# Patient Record
Sex: MALE | Race: WHITE | Employment: OTHER | URBAN - METROPOLITAN AREA
[De-identification: names, ages, dates, MRNs, and addresses within clinical notes are randomized per-mention and may not be internally consistent; named-entity substitution may affect disease eponyms.]

---

## 2017-03-27 ENCOUNTER — OFFICE VISIT (OUTPATIENT)
Dept: FAMILY MEDICINE CLINIC | Age: 67
End: 2017-03-27

## 2017-03-27 VITALS
HEIGHT: 72 IN | HEART RATE: 85 BPM | SYSTOLIC BLOOD PRESSURE: 120 MMHG | OXYGEN SATURATION: 95 % | WEIGHT: 195.8 LBS | DIASTOLIC BLOOD PRESSURE: 80 MMHG | BODY MASS INDEX: 26.52 KG/M2

## 2017-03-27 DIAGNOSIS — C44.300 SKIN CANCER OF FACE: ICD-10-CM

## 2017-03-27 DIAGNOSIS — Z23 NEED FOR PNEUMOCOCCAL VACCINATION: ICD-10-CM

## 2017-03-27 DIAGNOSIS — Z01.818 PRE-OP EXAM: Primary | ICD-10-CM

## 2017-03-27 PROCEDURE — G8420 CALC BMI NORM PARAMETERS: HCPCS | Performed by: FAMILY MEDICINE

## 2017-03-27 PROCEDURE — 3017F COLORECTAL CA SCREEN DOC REV: CPT | Performed by: FAMILY MEDICINE

## 2017-03-27 PROCEDURE — G8427 DOCREV CUR MEDS BY ELIG CLIN: HCPCS | Performed by: FAMILY MEDICINE

## 2017-03-27 PROCEDURE — 99214 OFFICE O/P EST MOD 30 MIN: CPT | Performed by: FAMILY MEDICINE

## 2017-03-27 PROCEDURE — 1036F TOBACCO NON-USER: CPT | Performed by: FAMILY MEDICINE

## 2017-03-27 PROCEDURE — 4040F PNEUMOC VAC/ADMIN/RCVD: CPT | Performed by: FAMILY MEDICINE

## 2017-03-27 PROCEDURE — 1123F ACP DISCUSS/DSCN MKR DOCD: CPT | Performed by: FAMILY MEDICINE

## 2017-03-27 PROCEDURE — G0009 ADMIN PNEUMOCOCCAL VACCINE: HCPCS | Performed by: FAMILY MEDICINE

## 2017-03-27 PROCEDURE — 90670 PCV13 VACCINE IM: CPT | Performed by: FAMILY MEDICINE

## 2017-03-27 PROCEDURE — G8484 FLU IMMUNIZE NO ADMIN: HCPCS | Performed by: FAMILY MEDICINE

## 2017-05-30 RX ORDER — LEVOTHYROXINE SODIUM 0.12 MG/1
TABLET ORAL
Qty: 30 TABLET | Refills: 5 | Status: SHIPPED | OUTPATIENT
Start: 2017-05-30 | End: 2017-06-05 | Stop reason: SDUPTHER

## 2017-06-05 ENCOUNTER — TELEPHONE (OUTPATIENT)
Dept: FAMILY MEDICINE CLINIC | Age: 67
End: 2017-06-05

## 2017-06-05 RX ORDER — LEVOTHYROXINE SODIUM 0.12 MG/1
TABLET ORAL
Qty: 30 TABLET | Refills: 3 | Status: SHIPPED | OUTPATIENT
Start: 2017-06-05 | End: 2018-03-03 | Stop reason: SDUPTHER

## 2017-08-02 ENCOUNTER — TELEPHONE (OUTPATIENT)
Dept: FAMILY MEDICINE CLINIC | Age: 67
End: 2017-08-02

## 2017-08-08 ENCOUNTER — TELEPHONE (OUTPATIENT)
Dept: ENT CLINIC | Age: 67
End: 2017-08-08

## 2017-08-08 ENCOUNTER — OFFICE VISIT (OUTPATIENT)
Dept: ENT CLINIC | Age: 67
End: 2017-08-08

## 2017-08-08 VITALS
HEART RATE: 78 BPM | DIASTOLIC BLOOD PRESSURE: 68 MMHG | TEMPERATURE: 97.6 F | SYSTOLIC BLOOD PRESSURE: 111 MMHG | WEIGHT: 195 LBS | BODY MASS INDEX: 26.41 KG/M2 | HEIGHT: 72 IN

## 2017-08-08 DIAGNOSIS — M26.621 ARTHRALGIA OF RIGHT TEMPOROMANDIBULAR JOINT: Primary | ICD-10-CM

## 2017-08-08 DIAGNOSIS — K08.89 PAIN, DENTAL: ICD-10-CM

## 2017-08-08 PROCEDURE — 99203 OFFICE O/P NEW LOW 30 MIN: CPT | Performed by: OTOLARYNGOLOGY

## 2017-08-08 PROCEDURE — 4040F PNEUMOC VAC/ADMIN/RCVD: CPT | Performed by: OTOLARYNGOLOGY

## 2017-08-08 PROCEDURE — 1036F TOBACCO NON-USER: CPT | Performed by: OTOLARYNGOLOGY

## 2017-08-08 PROCEDURE — G8427 DOCREV CUR MEDS BY ELIG CLIN: HCPCS | Performed by: OTOLARYNGOLOGY

## 2017-08-08 PROCEDURE — G8419 CALC BMI OUT NRM PARAM NOF/U: HCPCS | Performed by: OTOLARYNGOLOGY

## 2017-08-08 PROCEDURE — 3017F COLORECTAL CA SCREEN DOC REV: CPT | Performed by: OTOLARYNGOLOGY

## 2017-08-08 RX ORDER — NAPROXEN 500 MG/1
500 TABLET ORAL 2 TIMES DAILY WITH MEALS
Qty: 60 TABLET | Refills: 2 | Status: SHIPPED | OUTPATIENT
Start: 2017-08-08 | End: 2021-04-23 | Stop reason: ALTCHOICE

## 2017-08-11 ENCOUNTER — HOSPITAL ENCOUNTER (OUTPATIENT)
Dept: CT IMAGING | Age: 67
Discharge: HOME | End: 2017-08-11
Attending: OTOLARYNGOLOGY | Admitting: OTOLARYNGOLOGY

## 2017-08-11 DIAGNOSIS — R51 HEADACHE(784.0): ICD-10-CM

## 2017-08-11 DIAGNOSIS — K08.89 PAIN, DENTAL: ICD-10-CM

## 2017-08-11 DIAGNOSIS — M26.621 ARTHRALGIA OF RIGHT TEMPOROMANDIBULAR JOINT: ICD-10-CM

## 2017-08-14 ENCOUNTER — TELEPHONE (OUTPATIENT)
Dept: ENT CLINIC | Age: 67
End: 2017-08-14

## 2017-08-28 ENCOUNTER — OFFICE VISIT (OUTPATIENT)
Dept: FAMILY MEDICINE CLINIC | Age: 67
End: 2017-08-28

## 2017-08-28 ENCOUNTER — TELEPHONE (OUTPATIENT)
Dept: FAMILY MEDICINE CLINIC | Age: 67
End: 2017-08-28

## 2017-08-28 VITALS
HEART RATE: 72 BPM | TEMPERATURE: 98.4 F | BODY MASS INDEX: 25.76 KG/M2 | SYSTOLIC BLOOD PRESSURE: 112 MMHG | WEIGHT: 190.2 LBS | DIASTOLIC BLOOD PRESSURE: 70 MMHG | HEIGHT: 72 IN | OXYGEN SATURATION: 98 %

## 2017-08-28 DIAGNOSIS — J01.00 ACUTE MAXILLARY SINUSITIS, RECURRENCE NOT SPECIFIED: ICD-10-CM

## 2017-08-28 PROCEDURE — G8427 DOCREV CUR MEDS BY ELIG CLIN: HCPCS | Performed by: NURSE PRACTITIONER

## 2017-08-28 PROCEDURE — 1123F ACP DISCUSS/DSCN MKR DOCD: CPT | Performed by: NURSE PRACTITIONER

## 2017-08-28 PROCEDURE — G8419 CALC BMI OUT NRM PARAM NOF/U: HCPCS | Performed by: NURSE PRACTITIONER

## 2017-08-28 PROCEDURE — 1036F TOBACCO NON-USER: CPT | Performed by: NURSE PRACTITIONER

## 2017-08-28 PROCEDURE — 3017F COLORECTAL CA SCREEN DOC REV: CPT | Performed by: NURSE PRACTITIONER

## 2017-08-28 PROCEDURE — 99213 OFFICE O/P EST LOW 20 MIN: CPT | Performed by: NURSE PRACTITIONER

## 2017-08-28 PROCEDURE — 4040F PNEUMOC VAC/ADMIN/RCVD: CPT | Performed by: NURSE PRACTITIONER

## 2017-08-28 RX ORDER — AMOXICILLIN AND CLAVULANATE POTASSIUM 875; 125 MG/1; MG/1
1 TABLET, FILM COATED ORAL 2 TIMES DAILY WITH MEALS
Qty: 20 TABLET | Refills: 0 | Status: SHIPPED | OUTPATIENT
Start: 2017-08-28 | End: 2017-09-07

## 2017-08-28 ASSESSMENT — ENCOUNTER SYMPTOMS
FACIAL SWELLING: 0
COUGH: 0
ABDOMINAL PAIN: 0
CONSTIPATION: 0
EYES NEGATIVE: 1
SHORTNESS OF BREATH: 0
DIARRHEA: 0
TROUBLE SWALLOWING: 0
SWOLLEN GLANDS: 0
RHINORRHEA: 1
VOICE CHANGE: 0
APNEA: 0
SINUS PRESSURE: 1
VOMITING: 0
NAUSEA: 0
BLOOD IN STOOL: 0
BACK PAIN: 0
SORE THROAT: 0
ANAL BLEEDING: 0
ABDOMINAL DISTENTION: 0
COLOR CHANGE: 0
CHOKING: 0
STRIDOR: 0
HOARSE VOICE: 1
WHEEZING: 0
CHEST TIGHTNESS: 0

## 2018-01-12 ENCOUNTER — NURSE ONLY (OUTPATIENT)
Dept: FAMILY MEDICINE CLINIC | Age: 68
End: 2018-01-12

## 2018-01-12 DIAGNOSIS — Z23 NEED FOR INFLUENZA VACCINATION: Primary | ICD-10-CM

## 2018-01-12 PROCEDURE — G0008 ADMIN INFLUENZA VIRUS VAC: HCPCS | Performed by: FAMILY MEDICINE

## 2018-01-12 PROCEDURE — 90662 IIV NO PRSV INCREASED AG IM: CPT | Performed by: FAMILY MEDICINE

## 2018-03-03 ENCOUNTER — TELEPHONE (OUTPATIENT)
Dept: FAMILY MEDICINE CLINIC | Age: 68
End: 2018-03-03

## 2018-03-03 RX ORDER — LEVOTHYROXINE SODIUM 0.12 MG/1
TABLET ORAL
Qty: 30 TABLET | Refills: 3 | Status: SHIPPED | OUTPATIENT
Start: 2018-03-03 | End: 2018-06-01 | Stop reason: SDUPTHER

## 2018-06-01 RX ORDER — LEVOTHYROXINE SODIUM 0.12 MG/1
TABLET ORAL
Qty: 30 TABLET | Refills: 0 | Status: SHIPPED | OUTPATIENT
Start: 2018-06-01 | End: 2018-07-03 | Stop reason: SDUPTHER

## 2018-07-03 RX ORDER — LEVOTHYROXINE SODIUM 0.12 MG/1
TABLET ORAL
Qty: 30 TABLET | Refills: 1 | Status: SHIPPED | OUTPATIENT
Start: 2018-07-03 | End: 2018-09-14 | Stop reason: SDUPTHER

## 2018-08-01 ENCOUNTER — OFFICE VISIT (OUTPATIENT)
Dept: FAMILY MEDICINE CLINIC | Age: 68
End: 2018-08-01

## 2018-08-01 VITALS
HEART RATE: 63 BPM | SYSTOLIC BLOOD PRESSURE: 120 MMHG | WEIGHT: 188.1 LBS | OXYGEN SATURATION: 95 % | HEIGHT: 72 IN | BODY MASS INDEX: 25.48 KG/M2 | DIASTOLIC BLOOD PRESSURE: 82 MMHG

## 2018-08-01 DIAGNOSIS — Z00.00 WELL ADULT EXAM: ICD-10-CM

## 2018-08-01 DIAGNOSIS — J30.89 NON-SEASONAL ALLERGIC RHINITIS, UNSPECIFIED CHRONICITY, UNSPECIFIED TRIGGER: ICD-10-CM

## 2018-08-01 DIAGNOSIS — N52.8 OTHER MALE ERECTILE DYSFUNCTION: ICD-10-CM

## 2018-08-01 DIAGNOSIS — I83.893 VARICOSE VEINS OF BOTH LEGS WITH EDEMA: ICD-10-CM

## 2018-08-01 DIAGNOSIS — E03.9 HYPOTHYROIDISM, UNSPECIFIED TYPE: ICD-10-CM

## 2018-08-01 PROBLEM — J30.9 ALLERGIC RHINITIS: Status: ACTIVE | Noted: 2018-08-01

## 2018-08-01 PROBLEM — Z01.818 PRE-OP EXAM: Status: RESOLVED | Noted: 2017-03-27 | Resolved: 2018-08-01

## 2018-08-01 LAB
A/G RATIO: 1.8 (ref 1.1–2.2)
ALBUMIN SERPL-MCNC: 4.4 G/DL (ref 3.4–5)
ALP BLD-CCNC: 56 U/L (ref 40–129)
ALT SERPL-CCNC: 17 U/L (ref 10–40)
ANION GAP SERPL CALCULATED.3IONS-SCNC: 12 MMOL/L (ref 3–16)
AST SERPL-CCNC: 17 U/L (ref 15–37)
BASOPHILS ABSOLUTE: 0 K/UL (ref 0–0.2)
BASOPHILS RELATIVE PERCENT: 0.8 %
BILIRUB SERPL-MCNC: 0.3 MG/DL (ref 0–1)
BUN BLDV-MCNC: 14 MG/DL (ref 7–20)
CALCIUM SERPL-MCNC: 9.2 MG/DL (ref 8.3–10.6)
CHLORIDE BLD-SCNC: 101 MMOL/L (ref 99–110)
CHOLESTEROL, FASTING: 218 MG/DL (ref 0–199)
CO2: 28 MMOL/L (ref 21–32)
CREAT SERPL-MCNC: 1 MG/DL (ref 0.8–1.3)
EOSINOPHILS ABSOLUTE: 0.3 K/UL (ref 0–0.6)
EOSINOPHILS RELATIVE PERCENT: 4.9 %
GFR AFRICAN AMERICAN: >60
GFR NON-AFRICAN AMERICAN: >60
GLOBULIN: 2.4 G/DL
GLUCOSE FASTING: 89 MG/DL (ref 70–99)
HCT VFR BLD CALC: 45.3 % (ref 40.5–52.5)
HDLC SERPL-MCNC: 46 MG/DL (ref 40–60)
HEMOGLOBIN: 15.2 G/DL (ref 13.5–17.5)
LDL CHOLESTEROL CALCULATED: 122 MG/DL
LYMPHOCYTES ABSOLUTE: 1.6 K/UL (ref 1–5.1)
LYMPHOCYTES RELATIVE PERCENT: 30.9 %
MCH RBC QN AUTO: 32 PG (ref 26–34)
MCHC RBC AUTO-ENTMCNC: 33.4 G/DL (ref 31–36)
MCV RBC AUTO: 95.6 FL (ref 80–100)
MONOCYTES ABSOLUTE: 0.4 K/UL (ref 0–1.3)
MONOCYTES RELATIVE PERCENT: 8 %
NEUTROPHILS ABSOLUTE: 2.9 K/UL (ref 1.7–7.7)
NEUTROPHILS RELATIVE PERCENT: 55.4 %
PDW BLD-RTO: 13.5 % (ref 12.4–15.4)
PLATELET # BLD: 228 K/UL (ref 135–450)
PMV BLD AUTO: 8.8 FL (ref 5–10.5)
POTASSIUM SERPL-SCNC: 4.7 MMOL/L (ref 3.5–5.1)
RBC # BLD: 4.74 M/UL (ref 4.2–5.9)
SODIUM BLD-SCNC: 141 MMOL/L (ref 136–145)
TOTAL PROTEIN: 6.8 G/DL (ref 6.4–8.2)
TRIGLYCERIDE, FASTING: 248 MG/DL (ref 0–150)
TSH SERPL DL<=0.05 MIU/L-ACNC: 2.43 UIU/ML (ref 0.27–4.2)
VLDLC SERPL CALC-MCNC: 50 MG/DL
WBC # BLD: 5.2 K/UL (ref 4–11)

## 2018-08-01 PROCEDURE — G0439 PPPS, SUBSEQ VISIT: HCPCS | Performed by: FAMILY MEDICINE

## 2018-08-01 PROCEDURE — 4040F PNEUMOC VAC/ADMIN/RCVD: CPT | Performed by: FAMILY MEDICINE

## 2018-08-01 RX ORDER — SILDENAFIL 100 MG/1
100 TABLET, FILM COATED ORAL PRN
Qty: 10 TABLET | Refills: 3 | Status: SHIPPED | OUTPATIENT
Start: 2018-08-01 | End: 2021-04-23 | Stop reason: ALTCHOICE

## 2018-08-01 ASSESSMENT — PATIENT HEALTH QUESTIONNAIRE - PHQ9
SUM OF ALL RESPONSES TO PHQ QUESTIONS 1-9: 0
SUM OF ALL RESPONSES TO PHQ9 QUESTIONS 1 & 2: 0
2. FEELING DOWN, DEPRESSED OR HOPELESS: 0
1. LITTLE INTEREST OR PLEASURE IN DOING THINGS: 0

## 2018-09-14 RX ORDER — LEVOTHYROXINE SODIUM 0.12 MG/1
TABLET ORAL
Qty: 30 TABLET | Refills: 1 | Status: SHIPPED | OUTPATIENT
Start: 2018-09-14 | End: 2018-11-12 | Stop reason: SDUPTHER

## 2019-02-08 ENCOUNTER — TELEPHONE (OUTPATIENT)
Dept: FAMILY MEDICINE CLINIC | Age: 69
End: 2019-02-08

## 2019-02-08 DIAGNOSIS — C44.300 SKIN CANCER OF FACE: Primary | ICD-10-CM

## 2019-06-24 ENCOUNTER — TELEPHONE (OUTPATIENT)
Dept: FAMILY MEDICINE CLINIC | Age: 69
End: 2019-06-24

## 2019-06-24 NOTE — TELEPHONE ENCOUNTER
Patient wife calling for Jamie Orozco. He is looking to schedule a pe and thyroid tested blood work. Symptoms not feeling well , tired not sleeping at night. cb to get him in soon.  Call Joselo Cox [phone]

## 2019-07-08 ENCOUNTER — OFFICE VISIT (OUTPATIENT)
Dept: FAMILY MEDICINE CLINIC | Age: 69
End: 2019-07-08
Payer: COMMERCIAL

## 2019-07-08 VITALS
WEIGHT: 192.4 LBS | OXYGEN SATURATION: 98 % | SYSTOLIC BLOOD PRESSURE: 120 MMHG | HEART RATE: 75 BPM | HEIGHT: 72 IN | DIASTOLIC BLOOD PRESSURE: 80 MMHG | BODY MASS INDEX: 26.06 KG/M2

## 2019-07-08 DIAGNOSIS — Z00.00 ROUTINE GENERAL MEDICAL EXAMINATION AT A HEALTH CARE FACILITY: ICD-10-CM

## 2019-07-08 DIAGNOSIS — Z11.59 NEED FOR HEPATITIS C SCREENING TEST: ICD-10-CM

## 2019-07-08 DIAGNOSIS — Z00.00 WELL ADULT EXAM: Primary | ICD-10-CM

## 2019-07-08 DIAGNOSIS — E03.9 HYPOTHYROIDISM, UNSPECIFIED TYPE: ICD-10-CM

## 2019-07-08 DIAGNOSIS — Z00.00 WELL ADULT EXAM: ICD-10-CM

## 2019-07-08 DIAGNOSIS — J30.89 NON-SEASONAL ALLERGIC RHINITIS, UNSPECIFIED TRIGGER: ICD-10-CM

## 2019-07-08 LAB
A/G RATIO: 2.3 (ref 1.1–2.2)
ALBUMIN SERPL-MCNC: 4.3 G/DL (ref 3.4–5)
ALP BLD-CCNC: 53 U/L (ref 40–129)
ALT SERPL-CCNC: 20 U/L (ref 10–40)
ANION GAP SERPL CALCULATED.3IONS-SCNC: 12 MMOL/L (ref 3–16)
AST SERPL-CCNC: 20 U/L (ref 15–37)
BASOPHILS ABSOLUTE: 0 K/UL (ref 0–0.2)
BASOPHILS RELATIVE PERCENT: 0.7 %
BILIRUB SERPL-MCNC: 0.4 MG/DL (ref 0–1)
BUN BLDV-MCNC: 17 MG/DL (ref 7–20)
CALCIUM SERPL-MCNC: 9 MG/DL (ref 8.3–10.6)
CHLORIDE BLD-SCNC: 107 MMOL/L (ref 99–110)
CHOLESTEROL, TOTAL: 195 MG/DL (ref 0–199)
CO2: 26 MMOL/L (ref 21–32)
CREAT SERPL-MCNC: 1 MG/DL (ref 0.8–1.3)
EOSINOPHILS ABSOLUTE: 0.2 K/UL (ref 0–0.6)
EOSINOPHILS RELATIVE PERCENT: 3.5 %
GFR AFRICAN AMERICAN: >60
GFR NON-AFRICAN AMERICAN: >60
GLOBULIN: 1.9 G/DL
GLUCOSE BLD-MCNC: 88 MG/DL (ref 70–99)
HCT VFR BLD CALC: 43 % (ref 40.5–52.5)
HDLC SERPL-MCNC: 47 MG/DL (ref 40–60)
HEMOGLOBIN: 14.6 G/DL (ref 13.5–17.5)
HEPATITIS C ANTIBODY INTERPRETATION: NORMAL
LDL CHOLESTEROL CALCULATED: 113 MG/DL
LYMPHOCYTES ABSOLUTE: 1.5 K/UL (ref 1–5.1)
LYMPHOCYTES RELATIVE PERCENT: 29.1 %
MCH RBC QN AUTO: 32.1 PG (ref 26–34)
MCHC RBC AUTO-ENTMCNC: 33.9 G/DL (ref 31–36)
MCV RBC AUTO: 94.8 FL (ref 80–100)
MONOCYTES ABSOLUTE: 0.4 K/UL (ref 0–1.3)
MONOCYTES RELATIVE PERCENT: 8.5 %
NEUTROPHILS ABSOLUTE: 3.1 K/UL (ref 1.7–7.7)
NEUTROPHILS RELATIVE PERCENT: 58.2 %
PDW BLD-RTO: 13.5 % (ref 12.4–15.4)
PLATELET # BLD: 205 K/UL (ref 135–450)
PMV BLD AUTO: 8.4 FL (ref 5–10.5)
POTASSIUM SERPL-SCNC: 4.6 MMOL/L (ref 3.5–5.1)
RBC # BLD: 4.54 M/UL (ref 4.2–5.9)
SODIUM BLD-SCNC: 145 MMOL/L (ref 136–145)
T4 FREE: 1.2 NG/DL (ref 0.9–1.8)
TOTAL PROTEIN: 6.2 G/DL (ref 6.4–8.2)
TRIGL SERPL-MCNC: 174 MG/DL (ref 0–150)
TSH SERPL DL<=0.05 MIU/L-ACNC: 1.96 UIU/ML (ref 0.27–4.2)
VLDLC SERPL CALC-MCNC: 35 MG/DL
WBC # BLD: 5.2 K/UL (ref 4–11)

## 2019-07-08 PROCEDURE — 4040F PNEUMOC VAC/ADMIN/RCVD: CPT | Performed by: FAMILY MEDICINE

## 2019-07-08 PROCEDURE — G0439 PPPS, SUBSEQ VISIT: HCPCS | Performed by: FAMILY MEDICINE

## 2019-07-08 PROCEDURE — 1123F ACP DISCUSS/DSCN MKR DOCD: CPT | Performed by: FAMILY MEDICINE

## 2019-07-08 PROCEDURE — 3017F COLORECTAL CA SCREEN DOC REV: CPT | Performed by: FAMILY MEDICINE

## 2019-07-08 ASSESSMENT — PATIENT HEALTH QUESTIONNAIRE - PHQ9
SUM OF ALL RESPONSES TO PHQ QUESTIONS 1-9: 0
SUM OF ALL RESPONSES TO PHQ QUESTIONS 1-9: 0

## 2019-07-08 ASSESSMENT — LIFESTYLE VARIABLES
HOW OFTEN DO YOU HAVE SIX OR MORE DRINKS ON ONE OCCASION: 0
HOW OFTEN DURING THE LAST YEAR HAVE YOU HAD A FEELING OF GUILT OR REMORSE AFTER DRINKING: 0
HOW OFTEN DURING THE LAST YEAR HAVE YOU FOUND THAT YOU WERE NOT ABLE TO STOP DRINKING ONCE YOU HAD STARTED: 0
HAS A RELATIVE, FRIEND, DOCTOR, OR ANOTHER HEALTH PROFESSIONAL EXPRESSED CONCERN ABOUT YOUR DRINKING OR SUGGESTED YOU CUT DOWN: 0
HAVE YOU OR SOMEONE ELSE BEEN INJURED AS A RESULT OF YOUR DRINKING: 0
AUDIT TOTAL SCORE: 1
HOW MANY STANDARD DRINKS CONTAINING ALCOHOL DO YOU HAVE ON A TYPICAL DAY: 0
HOW OFTEN DURING THE LAST YEAR HAVE YOU FAILED TO DO WHAT WAS NORMALLY EXPECTED FROM YOU BECAUSE OF DRINKING: 0
HOW OFTEN DURING THE LAST YEAR HAVE YOU BEEN UNABLE TO REMEMBER WHAT HAPPENED THE NIGHT BEFORE BECAUSE YOU HAD BEEN DRINKING: 0
HOW OFTEN DURING THE LAST YEAR HAVE YOU NEEDED AN ALCOHOLIC DRINK FIRST THING IN THE MORNING TO GET YOURSELF GOING AFTER A NIGHT OF HEAVY DRINKING: 0
HOW OFTEN DO YOU HAVE A DRINK CONTAINING ALCOHOL: 1
AUDIT-C TOTAL SCORE: 1

## 2019-07-08 ASSESSMENT — ANXIETY QUESTIONNAIRES: GAD7 TOTAL SCORE: 0

## 2019-11-01 ENCOUNTER — TELEPHONE (OUTPATIENT)
Dept: FAMILY MEDICINE CLINIC | Age: 69
End: 2019-11-01

## 2019-11-08 DIAGNOSIS — C44.300 SKIN CANCER OF FACE: Primary | ICD-10-CM

## 2019-12-16 RX ORDER — LEVOTHYROXINE SODIUM 0.12 MG/1
TABLET ORAL
Qty: 90 TABLET | Refills: 3 | Status: SHIPPED | OUTPATIENT
Start: 2019-12-16 | End: 2021-01-15 | Stop reason: SDUPTHER

## 2020-01-22 ENCOUNTER — OFFICE VISIT (OUTPATIENT)
Dept: DERMATOLOGY | Age: 70
End: 2020-01-22
Payer: COMMERCIAL

## 2020-01-22 PROBLEM — L57.0 ACTINIC KERATOSES: Status: ACTIVE | Noted: 2020-01-22

## 2020-01-22 PROBLEM — L82.0 SEBORRHEIC KERATOSES, INFLAMED: Status: ACTIVE | Noted: 2020-01-22

## 2020-01-22 PROBLEM — Z85.828 HISTORY OF BASAL CELL CARCINOMA: Status: ACTIVE | Noted: 2020-01-22

## 2020-01-22 PROBLEM — Z85.828 HISTORY OF SQUAMOUS CELL CARCINOMA OF SKIN: Status: ACTIVE | Noted: 2020-01-22

## 2020-01-22 PROCEDURE — 4040F PNEUMOC VAC/ADMIN/RCVD: CPT | Performed by: DERMATOLOGY

## 2020-01-22 PROCEDURE — G8427 DOCREV CUR MEDS BY ELIG CLIN: HCPCS | Performed by: DERMATOLOGY

## 2020-01-22 PROCEDURE — 3017F COLORECTAL CA SCREEN DOC REV: CPT | Performed by: DERMATOLOGY

## 2020-01-22 PROCEDURE — 1123F ACP DISCUSS/DSCN MKR DOCD: CPT | Performed by: DERMATOLOGY

## 2020-01-22 PROCEDURE — 99203 OFFICE O/P NEW LOW 30 MIN: CPT | Performed by: DERMATOLOGY

## 2020-01-22 PROCEDURE — G8484 FLU IMMUNIZE NO ADMIN: HCPCS | Performed by: DERMATOLOGY

## 2020-01-22 PROCEDURE — 1036F TOBACCO NON-USER: CPT | Performed by: DERMATOLOGY

## 2020-01-22 PROCEDURE — G8417 CALC BMI ABV UP PARAM F/U: HCPCS | Performed by: DERMATOLOGY

## 2020-01-22 PROCEDURE — 17110 DESTRUCTION B9 LES UP TO 14: CPT | Performed by: DERMATOLOGY

## 2020-01-22 NOTE — PROGRESS NOTES
[address] [facility], [facility]) Physicians    Previous clinic visit: none      CC: lesion check      HPI:    1.)  Here today with several scaly red spots on face and scalp of uncertain duration. Has h/o AKs in past s/p LN2 tx.     2.)  Has h/o many NMSCs over past several years, s/p excisions and MMS by outside derm group. No new lesions of concern today. 3.) Has itchy spot on L mid back of many mo duration; no tx to date    DERM HISTORY:   Personal history of NMSC or MM- yes - NMSCs    Family history of NMSC or MM- no   Sunburns easily- Yes   Uses sunscreen- Yes  History of tanning bed use- No    ADDITIONAL HISTORY:    I have reviewed past medical and surgical histories, current medications, allergies, social and family histories as documented in the patient's electronic medical record. Current Outpatient Medications   Medication Sig Dispense Refill    levothyroxine (SYNTHROID) 125 MCG tablet TAKE 1 TABLET BY MOUTH EVERY DAY 90 tablet 3    sildenafil (VIAGRA) 100 MG tablet Take 1 tablet by mouth as needed for Erectile Dysfunction 10 tablet 3    naproxen (NAPROSYN) 500 MG tablet Take 1 tablet by mouth 2 times daily (with meals) 60 tablet 2     No current facility-administered medications for this visit.         ROS:    General: No fevers, chills, sweats, unexplained weight loss or weight gain, fatigue, malaise   Skin: Denies additional lesions    Heme: No history of bleeding diatheses    Allergy: Denies seasonal or environmental allergies or other medication allergies     PHYSICAL EXAM:    General: Well-appearing, NAD    Neurologic/psychiatric: Patient is AOx3; mood and affect are appropriate and congruent  Eyes: conjunctivae and eyelids without erythema, injection, or discharge   Integument: Examination was performed of the following and were unremarkable except as otherwise noted below:scalp, hair, face, ears, mucosa of gums/teeth/cutaneous and mucosal lips, buccal mucosa, oropharynx, neck,

## 2020-01-23 ENCOUNTER — TELEPHONE (OUTPATIENT)
Dept: DERMATOLOGY | Age: 70
End: 2020-01-23

## 2020-01-23 NOTE — TELEPHONE ENCOUNTER
Patient of Ethan Buitrago referred to Dr. Gonzalez Wood for blue light pdt  Tx. Manuel has several questions regarding the procedure. He can be reached at [phone].   Thanks

## 2020-01-27 NOTE — TELEPHONE ENCOUNTER
ASSESSMENT AND PLAN:    1.)  AKs, diffuse:  - Plan for PDT to forehead, temples, malar cheeks, nasal dorsum, crown and scalp vertex with 1 hr 20 min incubation time.    - MD f/u 8 weeks after tx  Scanned into chart under media

## 2020-02-07 ENCOUNTER — OFFICE VISIT (OUTPATIENT)
Dept: FAMILY MEDICINE CLINIC | Age: 70
End: 2020-02-07
Payer: COMMERCIAL

## 2020-02-07 VITALS
HEART RATE: 77 BPM | OXYGEN SATURATION: 98 % | SYSTOLIC BLOOD PRESSURE: 120 MMHG | BODY MASS INDEX: 26.38 KG/M2 | WEIGHT: 194.8 LBS | DIASTOLIC BLOOD PRESSURE: 80 MMHG | HEIGHT: 72 IN

## 2020-02-07 DIAGNOSIS — E03.9 HYPOTHYROIDISM, UNSPECIFIED TYPE: ICD-10-CM

## 2020-02-07 PROBLEM — R22.31 MASS OF RIGHT HAND: Status: ACTIVE | Noted: 2020-02-07

## 2020-02-07 LAB
T4 FREE: 1.3 NG/DL (ref 0.9–1.8)
TSH SERPL DL<=0.05 MIU/L-ACNC: 3.2 UIU/ML (ref 0.27–4.2)

## 2020-02-07 PROCEDURE — 99213 OFFICE O/P EST LOW 20 MIN: CPT | Performed by: FAMILY MEDICINE

## 2020-02-07 PROCEDURE — G8484 FLU IMMUNIZE NO ADMIN: HCPCS | Performed by: FAMILY MEDICINE

## 2020-02-07 PROCEDURE — G8417 CALC BMI ABV UP PARAM F/U: HCPCS | Performed by: FAMILY MEDICINE

## 2020-02-07 PROCEDURE — G8427 DOCREV CUR MEDS BY ELIG CLIN: HCPCS | Performed by: FAMILY MEDICINE

## 2020-02-07 PROCEDURE — 1036F TOBACCO NON-USER: CPT | Performed by: FAMILY MEDICINE

## 2020-02-07 PROCEDURE — 4040F PNEUMOC VAC/ADMIN/RCVD: CPT | Performed by: FAMILY MEDICINE

## 2020-02-07 PROCEDURE — 1123F ACP DISCUSS/DSCN MKR DOCD: CPT | Performed by: FAMILY MEDICINE

## 2020-02-07 PROCEDURE — 3017F COLORECTAL CA SCREEN DOC REV: CPT | Performed by: FAMILY MEDICINE

## 2020-02-07 SDOH — ECONOMIC STABILITY: INCOME INSECURITY: HOW HARD IS IT FOR YOU TO PAY FOR THE VERY BASICS LIKE FOOD, HOUSING, MEDICAL CARE, AND HEATING?: NOT HARD AT ALL

## 2020-02-07 SDOH — ECONOMIC STABILITY: TRANSPORTATION INSECURITY
IN THE PAST 12 MONTHS, HAS LACK OF TRANSPORTATION KEPT YOU FROM MEETINGS, WORK, OR FROM GETTING THINGS NEEDED FOR DAILY LIVING?: NO

## 2020-02-07 SDOH — ECONOMIC STABILITY: TRANSPORTATION INSECURITY
IN THE PAST 12 MONTHS, HAS THE LACK OF TRANSPORTATION KEPT YOU FROM MEDICAL APPOINTMENTS OR FROM GETTING MEDICATIONS?: NO

## 2020-02-07 SDOH — ECONOMIC STABILITY: FOOD INSECURITY: WITHIN THE PAST 12 MONTHS, YOU WORRIED THAT YOUR FOOD WOULD RUN OUT BEFORE YOU GOT MONEY TO BUY MORE.: NEVER TRUE

## 2020-02-07 SDOH — ECONOMIC STABILITY: FOOD INSECURITY: WITHIN THE PAST 12 MONTHS, THE FOOD YOU BOUGHT JUST DIDN'T LAST AND YOU DIDN'T HAVE MONEY TO GET MORE.: NEVER TRUE

## 2020-02-07 ASSESSMENT — PATIENT HEALTH QUESTIONNAIRE - PHQ9
1. LITTLE INTEREST OR PLEASURE IN DOING THINGS: 0
2. FEELING DOWN, DEPRESSED OR HOPELESS: 0
SUM OF ALL RESPONSES TO PHQ9 QUESTIONS 1 & 2: 0
SUM OF ALL RESPONSES TO PHQ QUESTIONS 1-9: 0
SUM OF ALL RESPONSES TO PHQ QUESTIONS 1-9: 0

## 2020-02-07 NOTE — PROGRESS NOTES
Subjective:     Patient ID:Michael Kurtz is a 78 y.o. male. Hand Pain    The incident occurred more than 1 week ago. The incident occurred at home. There was no injury mechanism. The pain is present in the right hand. The quality of the pain is described as burning. The patient is experiencing no pain. The pain has been improving since the incident. Pertinent negatives include no muscle weakness or tingling. Nothing aggravates the symptoms. He has tried nothing for the symptoms. The treatment provided moderate relief. Hypothyroidism: Patient presents for evaluation of thyroid function. Symptoms consist of insomnia. Symptoms have present for 7 months. The symptoms are mild. The problem has been unchanged. Previous thyroid studies include TSH and total T4. The hypothyroidism is due to hypothyroidism. No Known Allergies    Current Outpatient Medications   Medication Sig Dispense Refill    levothyroxine (SYNTHROID) 125 MCG tablet TAKE 1 TABLET BY MOUTH EVERY DAY 90 tablet 3    sildenafil (VIAGRA) 100 MG tablet Take 1 tablet by mouth as needed for Erectile Dysfunction 10 tablet 3    naproxen (NAPROSYN) 500 MG tablet Take 1 tablet by mouth 2 times daily (with meals) 60 tablet 2     No current facility-administered medications for this visit.         Past Medical History:   Diagnosis Date    Colorectal polyps 2003    Hx of colonoscopy 10/6/03    Hyperlipidemia 12/21/2010    Hypothyroidism        Past Surgical History:   Procedure Laterality Date    COLONOSCOPY  2000,10/30/2012    neg  q10    HEMORRHOID SURGERY  1961    HERNIA REPAIR  1990; 2003       Social History     Socioeconomic History    Marital status:      Spouse name: Not on file    Number of children: 3    Years of education: Not on file    Highest education level: Not on file   Occupational History    Occupation: retired [employer]    Social Needs    Financial resource strain: Not hard at all   [address] insecurity:     Worry: Never true     Inability: Never true    Transportation needs:     Medical: No     Non-medical: No   Tobacco Use    Smoking status: Never Smoker    Smokeless tobacco: Never Used   Substance and Sexual Activity    Alcohol use: Yes    Drug use: No    Sexual activity: Yes     Partners: Female   Lifestyle    Physical activity:     Days per week: Not on file     Minutes per session: Not on file    Stress: Not on file   Relationships    Social connections:     Talks on phone: Not on file     Gets together: Not on file     Attends Zoroastrian service: Not on file     Active member of club or organization: Not on file     Attends meetings of clubs or organizations: Not on file     Relationship status: Not on file    Intimate partner violence:     Fear of current or ex partner: Not on file     Emotionally abused: Not on file     Physically abused: Not on file     Forced sexual activity: Not on file   Other Topics Concern    Not on file   Social History Narrative    Happily  with 3 kids--1 lives with him(bipolar)    Works on [employer]    Exercise; walks    + seatbelts       Family History   Problem Relation Age of Onset    Cancer Sister         breast    Heart Disease Sister         congenital heart disease    Cancer Sister 29        breast    Cancer Sister 43        breast    Cancer Brother 72        pancreatic--Whipples       Immunization History   Administered Date(s) Administered    Influenza, High Dose (Fluzone 65 yrs and older) 10/19/2015, 01/12/2018    Influenza, Intradermal, Preservative free 01/14/2013    Pneumococcal Conjugate 13-valent ([id number]) 03/27/2017    Pneumococcal Polysaccharide ([id number]) 03/03/2015    Tdap (Boostrix, Adacel) 12/21/2010       Review of Systems  Review of Systems   Neurological: Negative for tingling. Objective:   Physical Exam  Physical Exam  Vitals signs reviewed. Constitutional:       General: He is not in acute distress.      Appearance:

## 2020-02-12 ENCOUNTER — OFFICE VISIT (OUTPATIENT)
Dept: ORTHOPEDIC SURGERY | Age: 70
End: 2020-02-12
Payer: COMMERCIAL

## 2020-02-12 VITALS — BODY MASS INDEX: 25.06 KG/M2 | HEIGHT: 72 IN | WEIGHT: 185 LBS

## 2020-02-12 PROCEDURE — G8484 FLU IMMUNIZE NO ADMIN: HCPCS | Performed by: ORTHOPAEDIC SURGERY

## 2020-02-12 PROCEDURE — 3017F COLORECTAL CA SCREEN DOC REV: CPT | Performed by: ORTHOPAEDIC SURGERY

## 2020-02-12 PROCEDURE — 1123F ACP DISCUSS/DSCN MKR DOCD: CPT | Performed by: ORTHOPAEDIC SURGERY

## 2020-02-12 PROCEDURE — 1036F TOBACCO NON-USER: CPT | Performed by: ORTHOPAEDIC SURGERY

## 2020-02-12 PROCEDURE — 4040F PNEUMOC VAC/ADMIN/RCVD: CPT | Performed by: ORTHOPAEDIC SURGERY

## 2020-02-12 PROCEDURE — G8427 DOCREV CUR MEDS BY ELIG CLIN: HCPCS | Performed by: ORTHOPAEDIC SURGERY

## 2020-02-12 PROCEDURE — 99203 OFFICE O/P NEW LOW 30 MIN: CPT | Performed by: ORTHOPAEDIC SURGERY

## 2020-02-12 PROCEDURE — G8417 CALC BMI ABV UP PARAM F/U: HCPCS | Performed by: ORTHOPAEDIC SURGERY

## 2020-02-12 NOTE — PROGRESS NOTES
ecchymosis or wounds  No warmth with appropriate finger tenodesis and resting cascade    Palpation: Mass is slightly firm and mobile, mild tenderness to palpation  No excursion of mass with thumb and finger range of motion  Negative Tinel's overlying the soft tissue mass and thenar eminence    Range of Motion: Symmetrical thumb and finger range of motion compared with contralateral hand and fingers  No pain with wrist range of motion with symmetrical overall total arc of motion      Strength: 5 out of 5 EPL FPL thumb abduction  5/5 FDS FDP EDC interossei with no mechanical symptoms    Special Tests: Gross sensation intact median ulnar and radial nerve throughout the hand without deficit  Mild tenderness to palpation thumb CMC joint with mild crepitus with CMC grind test  no obvious transillumination of soft tissue mass with testing      Skin: There are no additional worrisome rashes, ulcerations or lesions. Gait: normal    Circulation: well perfused with brisk capillary refill in all fingers and thumb    Additional Comments:     Additional Examinations:  Left Upper Extremity: Examination of the left upper extremity does not show any tenderness, deformity or injury. Range of motion is unremarkable. There is no gross instability. There are no rashes, ulcerations or lesions. Strength and tone are normal.       Radiology:     X-rays obtained and reviewed in office:  Views: 3  Location right hand  Impression mild degenerative changes thumb CMC joint. No evidence of additional osseous abnormality or soft tissue abnormality including foreign body      Assessment: 54-year-old male presenting with history of symptomatic right hand/palmar mass now for approximately 3 months  1. Right Palmar soft tissue mass    Impression:   Encounter Diagnoses   Name Primary?     Pain of right hand Yes    Mass of soft tissue of hand        Office Procedures:  Orders Placed This Encounter   Procedures    XR HAND RIGHT (MIN 3 VIEWS) Mandeep Chau MD, Orthopedic Surgery, [facility] - [facility]     Referral Priority:   Routine     Referral Type:   Eval and Treat     Referral Reason:   Specialty Services Required     Requested Specialty:   Orthopedic Surgery     Number of Visits Requested:   1       Treatment Plan: Differential diagnosis discussed with patient today includingmass possibly related to vascular source or solid soft tissue mass within the palm. I have a lower suspicion for malignant process secondary to behavior of the mass. We did discuss that it is difficult to establish diagnosis without definitive excisional biopsy. The patient is symptomatic and I do think that this certainly would be an option. For further information and preoperative planning and to evaluate whether this is cystic in nature or solid we discussed advanced imaging. I have scheduled the patient to receive an ultrasound of the right palm and hand for further evaluation of the origin and contents of the soft tissue mass. The patient states that he would like to consider removal but for now he would like to monitor as he does have a busy schedule upcoming within the next several weeks. He understands to call particularly with any changes such as new pain continued growth or any other changes within the right hand or palm.   He is satisfied and in agreement with the plan today

## 2020-02-13 ENCOUNTER — OFFICE VISIT (OUTPATIENT)
Dept: ORTHOPEDIC SURGERY | Age: 70
End: 2020-02-13
Payer: COMMERCIAL

## 2020-02-13 VITALS — WEIGHT: 184.97 LBS | RESPIRATION RATE: 16 BRPM | BODY MASS INDEX: 25.05 KG/M2 | HEIGHT: 72 IN

## 2020-02-13 PROCEDURE — 99242 OFF/OP CONSLTJ NEW/EST SF 20: CPT | Performed by: INTERNAL MEDICINE

## 2020-02-13 PROCEDURE — G8428 CUR MEDS NOT DOCUMENT: HCPCS | Performed by: INTERNAL MEDICINE

## 2020-02-13 PROCEDURE — G8484 FLU IMMUNIZE NO ADMIN: HCPCS | Performed by: INTERNAL MEDICINE

## 2020-02-13 PROCEDURE — G8417 CALC BMI ABV UP PARAM F/U: HCPCS | Performed by: INTERNAL MEDICINE

## 2020-02-13 NOTE — PROGRESS NOTES
Chief Complaint:   Chief Complaint   Patient presents with    Hand Pain     Ultrasound right hand, no pain today          History of Present Illness:       Patient is a 78 y.o. male returns follow up for the above complaint. The patient was last seen approximately 3 monthsago. The symptoms show no change since the last visit. The patient has had no further testing for the problem. He has had some low-grade discomfort localizing to the soft tissue mass that has been identified on physical examination. He is seen in consultation at request of Dr. Riri Mendoza for diagnostic ultrasound evaluation. He rates the pain as 2/10 severity    He denies any constitutional symptoms or involuntary weight loss    He denies any neuritic character symptoms     Past Medical History:        Past Medical History:   Diagnosis Date    Colorectal polyps 2003    Hx of colonoscopy 10/6/03    Hyperlipidemia 12/21/2010    Hypothyroidism         Present Medications:         Current Outpatient Medications   Medication Sig Dispense Refill    levothyroxine (SYNTHROID) 125 MCG tablet TAKE 1 TABLET BY MOUTH EVERY DAY 90 tablet 3    sildenafil (VIAGRA) 100 MG tablet Take 1 tablet by mouth as needed for Erectile Dysfunction 10 tablet 3    naproxen (NAPROSYN) 500 MG tablet Take 1 tablet by mouth 2 times daily (with meals) 60 tablet 2     No current facility-administered medications for this visit. Allergies:      No Known Allergies        Review of Systems:    Pertinent items are noted in HPI    Review of systems reviewed from Patient History Form dated on February 13, 2020 and   available in the patient's chart under the Media tab. Vital Signs:      Vitals:    02/13/20 1357   Resp: 16        General Exam:     Constitutional: Patient is adequately groomed with no evidence of malnutrition  Mental Status: The patient is oriented to time, place and person. The patient's mood and affect are appropriate.   Vascular: Examination reveals no swelling or calf tenderness. Peripheral pulses are palpable and 2+. Lymphatics: no lymphadenopathy of the cervical or axillary regions or upper extremity      Physical Exam: right hand      Primary Exam:    Inspection: No deformity atrophy appreciable effusion      Palpation: There is a palpable professional soft tissue mass in the thenar eminence rubbery in consistency      Range of Motion: Full range at the thumb without pain      Strength: Normal at the thumb without pain      Special Tests: Negative      Skin: There are no rashes, ulcerations or lesions. Gait: Nonantalgic     Neurovascular - non focal and intact       Additional Comments:        Additional Examinations:                  Office Imaging Results/Procedures PerformedToday:     Limited ultrasound evaluation-right hand  Logic E ultrasound  12 MHz    Findings:  Soft tissue subcutaneous mass that overlies flexor pollicis brevis that has marked intrinsic hyperemia by Doppler imaging consistent with high-grade vascularity      Patient position seated with the wrist and forearm fully supinated and supported on examination table. Linear transducer was utilized. There is a subcutaneous soft tissue mass that is well circumscribed and overlies the flexor pollicus brevis muscle. The soft tissue measures 0.77 cm radial to ulnar and 0.5 cm anterior to posterior. The mass is noncompressible and nonpulsatile. However power Doppler imaging demonstrates grade 3/4 intrinsic hyperemia of the mass consistent with a vascular soft tissue mass. There were satellite regions in the subcutaneous tissue of increased hyperemia adjacent to the mass more prominent on the ulnar side of the mass that appeared contiguous with the mass    The mass was evaluated and short axis and long axis projections.           Office Procedures:     Orders Placed This Encounter   Procedures    US EXTREMITY RIGHT NON VASC LIMITED     Standing Status:   Future     Number of Occurrences:   1     Standing Expiration Date:   2/13/2021     Order Specific Question:   Reason for exam:     Answer:   dx           Other Outside Imaging and Testing Personally Reviewed:    Xr Hand Right (min 3 Views)    Result Date: 2/12/2020  Radiology exam is complete. No Radiologist dictation. Please follow up with ordering provider. Assessment   Impression: . Encounter Diagnosis   Name Primary?  Mass of soft tissue of hand Yes        Thenar eminence subcutaneous in location and superficial to flexor pollicis brevis      Plan:     Follow-up with Dr. Astrid Ramirez to discuss treatment options         Orders:        Orders Placed This Encounter   Procedures    US EXTREMITY RIGHT NON VASC LIMITED     Standing Status:   Future     Number of Occurrences:   1     Standing Expiration Date:   2/13/2021     Order Specific Question:   Reason for exam:     Answer:   dx         Freddie Billings MD.      Disclaimer: \"This note was dictated with voice recognition software. Though review and correction are routine, we apologize for any errors. \"

## 2020-02-17 ENCOUNTER — OFFICE VISIT (OUTPATIENT)
Dept: ORTHOPEDIC SURGERY | Age: 70
End: 2020-02-17
Payer: COMMERCIAL

## 2020-02-17 VITALS — HEIGHT: 72 IN | WEIGHT: 184 LBS | BODY MASS INDEX: 24.92 KG/M2

## 2020-02-17 PROBLEM — M79.89 MASS OF SOFT TISSUE OF HAND: Status: ACTIVE | Noted: 2020-02-07

## 2020-02-17 PROBLEM — M79.641 PAIN OF RIGHT HAND: Status: ACTIVE | Noted: 2020-02-17

## 2020-02-17 PROCEDURE — 1036F TOBACCO NON-USER: CPT | Performed by: ORTHOPAEDIC SURGERY

## 2020-02-17 PROCEDURE — G8484 FLU IMMUNIZE NO ADMIN: HCPCS | Performed by: ORTHOPAEDIC SURGERY

## 2020-02-17 PROCEDURE — 99204 OFFICE O/P NEW MOD 45 MIN: CPT | Performed by: ORTHOPAEDIC SURGERY

## 2020-02-17 PROCEDURE — 4040F PNEUMOC VAC/ADMIN/RCVD: CPT | Performed by: ORTHOPAEDIC SURGERY

## 2020-02-17 PROCEDURE — G8427 DOCREV CUR MEDS BY ELIG CLIN: HCPCS | Performed by: ORTHOPAEDIC SURGERY

## 2020-02-17 PROCEDURE — 3017F COLORECTAL CA SCREEN DOC REV: CPT | Performed by: ORTHOPAEDIC SURGERY

## 2020-02-17 PROCEDURE — G8420 CALC BMI NORM PARAMETERS: HCPCS | Performed by: ORTHOPAEDIC SURGERY

## 2020-02-17 PROCEDURE — 1123F ACP DISCUSS/DSCN MKR DOCD: CPT | Performed by: ORTHOPAEDIC SURGERY

## 2020-02-27 ENCOUNTER — OFFICE VISIT (OUTPATIENT)
Dept: ORTHOPEDIC SURGERY | Age: 70
End: 2020-02-27
Payer: COMMERCIAL

## 2020-02-27 VITALS — HEIGHT: 72 IN | BODY MASS INDEX: 24.93 KG/M2 | WEIGHT: 184.08 LBS

## 2020-02-27 PROCEDURE — 1123F ACP DISCUSS/DSCN MKR DOCD: CPT | Performed by: ORTHOPAEDIC SURGERY

## 2020-02-27 PROCEDURE — G8420 CALC BMI NORM PARAMETERS: HCPCS | Performed by: ORTHOPAEDIC SURGERY

## 2020-02-27 PROCEDURE — G8427 DOCREV CUR MEDS BY ELIG CLIN: HCPCS | Performed by: ORTHOPAEDIC SURGERY

## 2020-02-27 PROCEDURE — 99214 OFFICE O/P EST MOD 30 MIN: CPT | Performed by: ORTHOPAEDIC SURGERY

## 2020-02-27 PROCEDURE — 1036F TOBACCO NON-USER: CPT | Performed by: ORTHOPAEDIC SURGERY

## 2020-02-27 PROCEDURE — G8484 FLU IMMUNIZE NO ADMIN: HCPCS | Performed by: ORTHOPAEDIC SURGERY

## 2020-02-27 PROCEDURE — 3017F COLORECTAL CA SCREEN DOC REV: CPT | Performed by: ORTHOPAEDIC SURGERY

## 2020-02-27 PROCEDURE — 4040F PNEUMOC VAC/ADMIN/RCVD: CPT | Performed by: ORTHOPAEDIC SURGERY

## 2020-03-03 ENCOUNTER — OFFICE VISIT (OUTPATIENT)
Dept: ORTHOPEDIC SURGERY | Age: 70
End: 2020-03-03
Payer: COMMERCIAL

## 2020-03-03 VITALS — HEIGHT: 72 IN | WEIGHT: 184.08 LBS | BODY MASS INDEX: 24.93 KG/M2

## 2020-03-03 PROCEDURE — 3017F COLORECTAL CA SCREEN DOC REV: CPT | Performed by: ORTHOPAEDIC SURGERY

## 2020-03-03 PROCEDURE — 1036F TOBACCO NON-USER: CPT | Performed by: ORTHOPAEDIC SURGERY

## 2020-03-03 PROCEDURE — 99212 OFFICE O/P EST SF 10 MIN: CPT | Performed by: ORTHOPAEDIC SURGERY

## 2020-03-03 PROCEDURE — G8420 CALC BMI NORM PARAMETERS: HCPCS | Performed by: ORTHOPAEDIC SURGERY

## 2020-03-03 PROCEDURE — 4040F PNEUMOC VAC/ADMIN/RCVD: CPT | Performed by: ORTHOPAEDIC SURGERY

## 2020-03-03 PROCEDURE — G8427 DOCREV CUR MEDS BY ELIG CLIN: HCPCS | Performed by: ORTHOPAEDIC SURGERY

## 2020-03-03 PROCEDURE — 1123F ACP DISCUSS/DSCN MKR DOCD: CPT | Performed by: ORTHOPAEDIC SURGERY

## 2020-03-03 PROCEDURE — G8484 FLU IMMUNIZE NO ADMIN: HCPCS | Performed by: ORTHOPAEDIC SURGERY

## 2020-03-03 NOTE — PROGRESS NOTES
Assessment: 69-year-old male presenting with history of symptomatic right hand/palmar mass now for approximately 3 months  1. Right Palmar soft tissue mass    Treatment Plan: We discussed the results of MRI today that had been obtained recently  He had seen my partner Dr. Patrick Luna for the same lesion and I requested to see the patient back for follow-up. We had discussed at that first visit options including excisional biopsy of the mass. I do think that this will be an appropriate option for the patient based on the location of this mass. Currently he is asymptomatic and has not had no change in the size and suspicion for malignant mass is lower but I did discuss that the only way to find out the exact nature of the mass is for excisional biopsy. He is understanding of his options and for now prefers to wait for several months before considering removal as he has many things on his schedule as he had mentioned to me last time. I discussed with him the importance of follow-up and would recommend seeing him back within the next 6 to 8 weeks for repeat evaluation and further discussion of treatment if he does not prefer to have it removed at this time  MRI personally reviewed by me, discussed findings with patient, please see media tab for full detailed report    No follow-ups on file. History of Present Illness  Helene Valentine is a 78 y.o. male, right-hand-dominant, retired, presenting with history of right hand and palmar complaint. The patient is referral from Dr. Vonnie Mckeon for evaluation of a right palmar mass  The patient states that he noticed the mass approximately 3 months ago. It is located in the thenar eminence of the thumb and he has noticed some gradual but slow increase in the size. He does notice pain particularly with certain gripping and pinching activities and direct pressure overlying this area.   Occasionally he has radiating symptoms more near the base of his thumb as thumb CMC joint with mild crepitus with CMC grind test  no obvious transillumination of soft tissue mass with testing    Capillary refill brisk all fingers, symmetric  Gross sensation intact to light touch median/ulnar/radial nerves  Sensation intact to radial/ulnar aspect of fingertip        Radiology:    X-rays obtained and reviewed in office:  No new images obtained today    Additional Diagnostic Test Findings:    Office Procedures:  No orders of the defined types were placed in this encounter. Astrid Ramirez MD  Orthopaedic Surgeon, Hand & Upper Extremity   [address] [place] [address] Orthopaedic & Sports Medicine    Contact Information:  Christine Tran: [phone] Clinical )    This dictation was performed with a verbal recognition program [facility] ) and it was checked for errors. It is possible that there are still dictated errors within this office note. If so, please bring any errors to my attention for an addendum. All efforts were made to ensure that this office note is accurate.

## 2020-03-04 ENCOUNTER — NURSE ONLY (OUTPATIENT)
Dept: DERMATOLOGY | Age: 70
End: 2020-03-04
Payer: MEDICARE

## 2020-03-04 PROCEDURE — 96567 PDT DSTR PRMLG LES SKN: CPT | Performed by: DERMATOLOGY

## 2020-03-04 NOTE — PROGRESS NOTES
Photodynamic Therapy Procedure Note     Diagnosis: Actinic Keratoses    Location:    - Scalp     - Face      Procedure: The sites of treatment were verified with the patient and the previous progress note. The area to be treated was cleansed with alcohol (alcohol or acetone). Juan Bane was applied to the indicated area(s) above. 1.20 HRS (time in hours) later the patient was exposed to blue light via the Robson-U for 16 minutes and 40 seconds. 2 Levulan Kerastick(s) were used. [facility] # [id number]    The patient was provided with appropriate eye protection. The patient tolerated the procedure well. There were no immediate complications. The patient was educated regarding the potential side effects and risks including burning, stinging, erythema, edema, crusting, and dyspigmentation. The patient was instructed to avoid sun or intense light for 48 hours after the treatment.

## 2020-05-01 ENCOUNTER — TELEPHONE (OUTPATIENT)
Dept: DERMATOLOGY | Age: 70
End: 2020-05-01

## 2020-05-22 NOTE — PROGRESS NOTES
Results for the following test have been finalized and entered into the patients chart.
ulcerations or lesions. Trunk:  inspection reveals no rashes, ulcerations or lesions. Right Lower Extremity: inspection reveals no rashes, ulcerations or lesions. Left Lower Extremity: inspection reveals no rashes, ulcerations or lesions. Hand/ Thumb Examination  Inspection: Inspection visually there is no visual mass upon palpation you can feel this    Palpation: Patient reveals a mass that is somewhat superficial approximately 8 to 10 mm in length and 8 mm in width    Rang of Motion: Full range of motion is not involving the tendon    Strength: He has excellent  strength no tendon injury with flexion or extension    Special Tests: Radial ulnar and median nerve function is intact capillary refill is brisk sensation is intact negative Tinel's negative Phalen's at the wrist negative Tinel's at the elbow full range of motion of the hand he does have a palpable mass approximately 8 mm x 10 mm in the thenar eminence somewhat superficial and it does have some discomfort with palpation there is no induration there is no signs of anything like a Dupuytren's or dimpling of the skin    Skin: There are no rashes, ulcerations or lesions. Gait: Normal  Deep tendon reflexes of the biceps, triceps, brachioradialis +2/4 equal bilaterally      Additional Comments:     Additional Examinations:  Right Lower Extremity: Examination of the right lower extremity does not show any tenderness, deformity or injury. Range of motion is unremarkable. There is no gross instability. There are no rashes, ulcerations or lesions. Strength and tone are normal.  Left Lower Extremity: Examination of the left lower extremity does not show any tenderness, deformity or injury. Range of motion is unremarkable. There is no gross instability. There are no rashes, ulcerations or lesions. Strength and tone are normal.  Neck: Examination of the neck does not show any tenderness, deformity or injury. Range of motion is unremarkable.

## 2020-06-10 ENCOUNTER — OFFICE VISIT (OUTPATIENT)
Dept: DERMATOLOGY | Age: 70
End: 2020-06-10
Payer: MEDICARE

## 2020-06-10 VITALS — TEMPERATURE: 97.6 F

## 2020-06-10 PROCEDURE — 99213 OFFICE O/P EST LOW 20 MIN: CPT | Performed by: DERMATOLOGY

## 2020-06-10 NOTE — PROGRESS NOTES
Patient's Name: Akhil Naik  MRN: <[id number]>  YOB: 1950  Date of Visit: 6/10/2020  Primary Care Provider: Ghazala Blum MD  Referring Provider: No ref. provider found    Subjective:     Chief Complaint   Patient presents with    Follow-up     PDT on face, pt is recovering from sunburn on scalp. History of Present Illness: Patient presents for follow-up on actinic keratosis s/p PDT in March. Patient was last evaluated on 20. He reports that he had an exaggerated reaction after PDT because of being exposed to the light in his house and would've appreciated it if the staff warned him about not being exposed to regular light. Patient reports having a sunburn on scalp while at a  last week. He states that it was peeling very bad yesterday, however much improved today. He has a few spots that have been itchy on his back and would like them checked. Signs and symptoms: itching. Location: left flank. Severity: Severity now is 1/10. Modifying factors: Things that make this condition worse are none. Things that make this condition better are itching. Duration:  This condition has gone on for several months  Current treatment: None  Previous treatment: None        Past medical/surgical/family history reviewed with no changes since last visit on 20    Past Medical History:  Past Medical History:   Diagnosis Date    Colorectal polyps     Hx of colonoscopy 10/6/03    Hyperlipidemia 2010    Hypothyroidism        Past Surgical History:  Past Surgical History:   Procedure Laterality Date    COLONOSCOPY  ,10/30/2012    neg  q10    [address] [facility], [address]; 2003       Past Family History:  Family History   Problem Relation Age of Onset    Cancer Sister         breast    Heart Disease Sister         congenital heart disease    Cancer Sister 29        breast    Cancer Sister 43        breast    Cancer Brother 67

## 2020-07-11 ENCOUNTER — TELEPHONE (OUTPATIENT)
Dept: FAMILY MEDICINE CLINIC | Age: 70
End: 2020-07-11

## 2020-07-14 DIAGNOSIS — J30.89 NON-SEASONAL ALLERGIC RHINITIS, UNSPECIFIED TRIGGER: ICD-10-CM

## 2020-07-14 LAB — SARS-COV-2 ANTIBODY, TOTAL: NEGATIVE

## 2020-11-09 ENCOUNTER — TELEPHONE (OUTPATIENT)
Dept: FAMILY MEDICINE CLINIC | Age: 70
End: 2020-11-09

## 2020-11-09 NOTE — TELEPHONE ENCOUNTER
Patient calling stating he feels something is wrong with his heel. Patient jumped off of something, 11/7 and can walk on it but when walking pain is about 7 out of 10. Patient would like test or xray.     Please advise

## 2020-11-11 ENCOUNTER — HOSPITAL ENCOUNTER (OUTPATIENT)
Dept: GENERAL RADIOLOGY | Age: 70
Discharge: HOME | End: 2020-11-11
Payer: MEDICARE

## 2020-11-11 ENCOUNTER — HOSPITAL ENCOUNTER (OUTPATIENT)
Age: 70
Discharge: HOME | End: 2020-11-11
Payer: MEDICARE

## 2020-11-11 PROCEDURE — 73630 X-RAY EXAM OF FOOT: CPT

## 2020-11-21 ENCOUNTER — APPOINTMENT (OUTPATIENT)
Dept: CT IMAGING | Age: 70
End: 2020-11-21
Payer: MEDICARE

## 2020-11-21 ENCOUNTER — HOSPITAL ENCOUNTER (EMERGENCY)
Age: 70
Discharge: HOME | End: 2020-11-21
Attending: EMERGENCY MEDICINE
Payer: MEDICARE

## 2020-11-21 ENCOUNTER — NURSE TRIAGE (OUTPATIENT)
Dept: OTHER | Facility: CLINIC | Age: 70
End: 2020-11-21

## 2020-11-21 VITALS
RESPIRATION RATE: 18 BRPM | HEIGHT: 72 IN | SYSTOLIC BLOOD PRESSURE: 150 MMHG | HEART RATE: 62 BPM | BODY MASS INDEX: 25.19 KG/M2 | DIASTOLIC BLOOD PRESSURE: 85 MMHG | OXYGEN SATURATION: 95 % | WEIGHT: 186 LBS | TEMPERATURE: 97.7 F

## 2020-11-21 LAB
A/G RATIO: 1.7 (ref 1.1–2.2)
ALBUMIN SERPL-MCNC: 4.2 G/DL (ref 3.4–5)
ALP BLD-CCNC: 51 U/L (ref 40–129)
ALT SERPL-CCNC: 13 U/L (ref 10–40)
ANION GAP SERPL CALCULATED.3IONS-SCNC: 12 MMOL/L (ref 3–16)
AST SERPL-CCNC: 22 U/L (ref 15–37)
BASOPHILS ABSOLUTE: 0.1 K/UL (ref 0–0.2)
BASOPHILS RELATIVE PERCENT: 1.8 %
BILIRUB SERPL-MCNC: 0.4 MG/DL (ref 0–1)
BILIRUBIN URINE: NEGATIVE
BLOOD, URINE: ABNORMAL
BUN BLDV-MCNC: 15 MG/DL (ref 7–20)
CALCIUM SERPL-MCNC: 8.8 MG/DL (ref 8.3–10.6)
CHLORIDE BLD-SCNC: 105 MMOL/L (ref 99–110)
CLARITY: CLEAR
CO2: 22 MMOL/L (ref 21–32)
COLOR: YELLOW
CREAT SERPL-MCNC: 1.1 MG/DL (ref 0.8–1.3)
EOSINOPHILS ABSOLUTE: 0.1 K/UL (ref 0–0.6)
EOSINOPHILS RELATIVE PERCENT: 2.1 %
EPITHELIAL CELLS, UA: ABNORMAL /HPF (ref 0–5)
GFR AFRICAN AMERICAN: >60
GFR NON-AFRICAN AMERICAN: >60
GLOBULIN: 2.5 G/DL
GLUCOSE BLD-MCNC: 114 MG/DL (ref 70–99)
GLUCOSE URINE: NEGATIVE MG/DL
HCT VFR BLD CALC: 44.9 % (ref 40.5–52.5)
HEMOGLOBIN: 15 G/DL (ref 13.5–17.5)
KETONES, URINE: NEGATIVE MG/DL
LACTIC ACID: 2.5 MMOL/L (ref 0.4–2)
LEUKOCYTE ESTERASE, URINE: NEGATIVE
LIPASE: 22 U/L (ref 13–60)
LYMPHOCYTES ABSOLUTE: 1.1 K/UL (ref 1–5.1)
LYMPHOCYTES RELATIVE PERCENT: 19.9 %
MCH RBC QN AUTO: 31.6 PG (ref 26–34)
MCHC RBC AUTO-ENTMCNC: 33.5 G/DL (ref 31–36)
MCV RBC AUTO: 94.4 FL (ref 80–100)
MICROSCOPIC EXAMINATION: YES
MONOCYTES ABSOLUTE: 0.4 K/UL (ref 0–1.3)
MONOCYTES RELATIVE PERCENT: 6.7 %
MUCUS: ABNORMAL /LPF
NEUTROPHILS ABSOLUTE: 3.9 K/UL (ref 1.7–7.7)
NEUTROPHILS RELATIVE PERCENT: 69.5 %
NITRITE, URINE: NEGATIVE
PDW BLD-RTO: 13.6 % (ref 12.4–15.4)
PH UA: 5.5 (ref 5–8)
PLATELET # BLD: 226 K/UL (ref 135–450)
PMV BLD AUTO: 8.3 FL (ref 5–10.5)
POTASSIUM REFLEX MAGNESIUM: 4.6 MMOL/L (ref 3.5–5.1)
PROTEIN UA: ABNORMAL MG/DL
RBC # BLD: 4.76 M/UL (ref 4.2–5.9)
RBC UA: ABNORMAL /HPF (ref 0–4)
SODIUM BLD-SCNC: 139 MMOL/L (ref 136–145)
SPECIFIC GRAVITY UA: >=1.03 (ref 1–1.03)
TOTAL PROTEIN: 6.7 G/DL (ref 6.4–8.2)
TROPONIN: <0.01 NG/ML
URINE REFLEX TO CULTURE: ABNORMAL
URINE TYPE: ABNORMAL
UROBILINOGEN, URINE: 0.2 E.U./DL
WBC # BLD: 5.7 K/UL (ref 4–11)
WBC UA: ABNORMAL /HPF (ref 0–5)

## 2020-11-21 PROCEDURE — 96374 THER/PROPH/DIAG INJ IV PUSH: CPT

## 2020-11-21 PROCEDURE — 6360000004 HC RX CONTRAST MEDICATION: Performed by: EMERGENCY MEDICINE

## 2020-11-21 PROCEDURE — 81001 URINALYSIS AUTO W/SCOPE: CPT

## 2020-11-21 PROCEDURE — 83605 ASSAY OF LACTIC ACID: CPT

## 2020-11-21 PROCEDURE — 6360000002 HC RX W HCPCS: Performed by: EMERGENCY MEDICINE

## 2020-11-21 PROCEDURE — 80053 COMPREHEN METABOLIC PANEL: CPT

## 2020-11-21 PROCEDURE — 6370000000 HC RX 637 (ALT 250 FOR IP): Performed by: EMERGENCY MEDICINE

## 2020-11-21 PROCEDURE — 99285 EMERGENCY DEPT VISIT HI MDM: CPT

## 2020-11-21 PROCEDURE — 2580000003 HC RX 258: Performed by: EMERGENCY MEDICINE

## 2020-11-21 PROCEDURE — 83690 ASSAY OF LIPASE: CPT

## 2020-11-21 PROCEDURE — 36415 COLL VENOUS BLD VENIPUNCTURE: CPT

## 2020-11-21 PROCEDURE — 85025 COMPLETE CBC W/AUTO DIFF WBC: CPT

## 2020-11-21 PROCEDURE — 96375 TX/PRO/DX INJ NEW DRUG ADDON: CPT

## 2020-11-21 PROCEDURE — 74177 CT ABD & PELVIS W/CONTRAST: CPT

## 2020-11-21 PROCEDURE — 84484 ASSAY OF TROPONIN QUANT: CPT

## 2020-11-21 RX ORDER — TAMSULOSIN HYDROCHLORIDE 0.4 MG/1
0.4 CAPSULE ORAL DAILY
Qty: 5 CAPSULE | Refills: 0 | Status: SHIPPED | OUTPATIENT
Start: 2020-11-21 | End: 2021-04-23 | Stop reason: ALTCHOICE

## 2020-11-21 RX ORDER — KETOROLAC TROMETHAMINE 30 MG/ML
30 INJECTION, SOLUTION INTRAMUSCULAR; INTRAVENOUS ONCE
Status: COMPLETED | OUTPATIENT
Start: 2020-11-21 | End: 2020-11-21

## 2020-11-21 RX ORDER — MORPHINE SULFATE 10 MG/ML
6 INJECTION, SOLUTION INTRAMUSCULAR; INTRAVENOUS ONCE
Status: COMPLETED | OUTPATIENT
Start: 2020-11-21 | End: 2020-11-21

## 2020-11-21 RX ORDER — METOCLOPRAMIDE HYDROCHLORIDE 5 MG/ML
10 INJECTION INTRAMUSCULAR; INTRAVENOUS ONCE
Status: COMPLETED | OUTPATIENT
Start: 2020-11-21 | End: 2020-11-21

## 2020-11-21 RX ORDER — OXYCODONE HYDROCHLORIDE AND ACETAMINOPHEN 5; 325 MG/1; MG/1
1 TABLET ORAL EVERY 6 HOURS PRN
Qty: 12 TABLET | Refills: 0 | Status: SHIPPED | OUTPATIENT
Start: 2020-11-21 | End: 2020-11-24

## 2020-11-21 RX ORDER — KETOROLAC TROMETHAMINE 10 MG/1
10 TABLET, FILM COATED ORAL EVERY 6 HOURS PRN
Qty: 12 TABLET | Refills: 0 | Status: SHIPPED | OUTPATIENT
Start: 2020-11-21 | End: 2021-04-23 | Stop reason: ALTCHOICE

## 2020-11-21 RX ORDER — ONDANSETRON 2 MG/ML
4 INJECTION INTRAMUSCULAR; INTRAVENOUS ONCE
Status: COMPLETED | OUTPATIENT
Start: 2020-11-21 | End: 2020-11-21

## 2020-11-21 RX ORDER — 0.9 % SODIUM CHLORIDE 0.9 %
500 INTRAVENOUS SOLUTION INTRAVENOUS ONCE
Status: COMPLETED | OUTPATIENT
Start: 2020-11-21 | End: 2020-11-21

## 2020-11-21 RX ORDER — ONDANSETRON 4 MG/1
4 TABLET, ORALLY DISINTEGRATING ORAL EVERY 8 HOURS PRN
Qty: 20 TABLET | Refills: 0 | Status: SHIPPED | OUTPATIENT
Start: 2020-11-21 | End: 2021-04-23 | Stop reason: ALTCHOICE

## 2020-11-21 RX ORDER — TAMSULOSIN HYDROCHLORIDE 0.4 MG/1
0.4 CAPSULE ORAL ONCE
Status: COMPLETED | OUTPATIENT
Start: 2020-11-21 | End: 2020-11-21

## 2020-11-21 RX ADMIN — KETOROLAC TROMETHAMINE 30 MG: 30 INJECTION, SOLUTION INTRAMUSCULAR; INTRAVENOUS at 12:41

## 2020-11-21 RX ADMIN — MORPHINE SULFATE 6 MG: 10 INJECTION, SOLUTION INTRAMUSCULAR; INTRAVENOUS at 11:33

## 2020-11-21 RX ADMIN — TAMSULOSIN HYDROCHLORIDE 0.4 MG: 0.4 CAPSULE ORAL at 12:51

## 2020-11-21 RX ADMIN — SODIUM CHLORIDE 500 ML: 9 INJECTION, SOLUTION INTRAVENOUS at 11:33

## 2020-11-21 RX ADMIN — ONDANSETRON 4 MG: 2 INJECTION INTRAMUSCULAR; INTRAVENOUS at 11:33

## 2020-11-21 RX ADMIN — METOCLOPRAMIDE 10 MG: 5 INJECTION, SOLUTION INTRAMUSCULAR; INTRAVENOUS at 12:07

## 2020-11-21 RX ADMIN — IOPAMIDOL 80 ML: 755 INJECTION, SOLUTION INTRAVENOUS at 11:50

## 2020-11-21 ASSESSMENT — ENCOUNTER SYMPTOMS
ABDOMINAL DISTENTION: 0
NAUSEA: 0
VOMITING: 0
BLOOD IN STOOL: 0
COUGH: 0
SHORTNESS OF BREATH: 0
CONSTIPATION: 1
ABDOMINAL PAIN: 1
BACK PAIN: 1
SORE THROAT: 0
TROUBLE SWALLOWING: 0
WHEEZING: 0
DIARRHEA: 0
PHOTOPHOBIA: 0

## 2020-11-21 ASSESSMENT — PAIN DESCRIPTION - ORIENTATION
ORIENTATION: RIGHT
ORIENTATION: RIGHT

## 2020-11-21 ASSESSMENT — PAIN SCALES - GENERAL
PAINLEVEL_OUTOF10: 7
PAINLEVEL_OUTOF10: 4
PAINLEVEL_OUTOF10: 7
PAINLEVEL_OUTOF10: 7

## 2020-11-21 ASSESSMENT — PAIN DESCRIPTION - DESCRIPTORS
DESCRIPTORS: ACHING
DESCRIPTORS: STABBING

## 2020-11-21 ASSESSMENT — PAIN DESCRIPTION - LOCATION
LOCATION: BACK;FLANK
LOCATION: FLANK

## 2020-11-21 ASSESSMENT — PAIN DESCRIPTION - PAIN TYPE
TYPE: ACUTE PAIN
TYPE: ACUTE PAIN

## 2020-11-21 ASSESSMENT — PAIN DESCRIPTION - FREQUENCY
FREQUENCY: CONTINUOUS
FREQUENCY: CONTINUOUS

## 2020-11-21 NOTE — TELEPHONE ENCOUNTER
Reason for Disposition   [1] MILD-MODERATE pain AND [2] constant AND [3] present > 2 hours    Answer Assessment - Initial Assessment Questions  1. LOCATION: \"Where does it hurt? \"       [facility]     2. RADIATION: \"Does the pain shoot anywhere else? \" (e.g., chest, back)      Unsure if radiating but is having back pain as well     3. ONSET: \"When did the pain begin? \" (Minutes, hours or days ago)        Couple days ago and went away and came back and now has back pain as well     4. SUDDEN: \"Gradual or sudden onset? \"         Gradual- felt like he needed to have a BM and never felt he had a complete BM     5. PATTERN \"Does the pain come and go, or is it constant? \"     - If constant: \"Is it getting better, staying the same, or worsening? \"       (Note: Constant means the pain never goes away completely; most serious pain is constant and it progresses)      - If intermittent: \"How long does it last?\" \"Do you have pain now? \"      (Note: Intermittent means the pain goes away completely between bouts)               Constant     6. SEVERITY: \"How bad is the pain? \"  (e.g., Scale 1-10; mild, moderate, or severe)     - MILD (1-3): doesn't interfere with normal activities, abdomen soft and not tender to touch      - MODERATE (4-7): interferes with normal activities or awakens from sleep, tender to touch      - SEVERE (8-10): excruciating pain, doubled over, unable to do any normal activities                5/10    7. RECURRENT SYMPTOM: \"Have you ever had this type of abdominal pain before? \" If so, ask: \"When was the last time? \" and \"What happened that time? \"         Denies     8. CAUSE: \"What do you think is causing the abdominal pain? \"      Pt has hx of kidney stones in past but not pain in this area       Pt has hx of intestinal problems but this feels different     9. RELIEVING/AGGRAVATING FACTORS: \"What makes it better or worse? \" (e.g., movement, antacids, bowel movement)        Walking did help yesterday but not as much today

## 2020-11-21 NOTE — ED NOTES
Patient prepared for and ready to be discharged. Patient discharged at this time in no acute distress after verbalizing understanding of discharge instructions. Patient left after receiving After Visit Summary instructions.       Celia Rodríguez RN  11/21/20 5660

## 2020-11-21 NOTE — ED NOTES
Pt returned from ct pt C/o increased nausea with small amt of emesis. MD advised.      Mia Persaud, RN  11/21/20 0364

## 2020-11-21 NOTE — ED PROVIDER NOTES
[address]          ATTENDING PHYSICIAN NOTE       Date of evaluation: 11/21/2020    Chief Complaint     Back Pain      History of Present Illness     Philippa Holstein is a 69 y.o. male with a past medical history of hyperlipidemia, hypothyroidism, and history of intussusception 50 years ago status post bowel resection the presents today for evaluation of abdominal pain/flank pain. The patient relates that the symptoms started 2 days ago. Patient states that the symptoms are intermittent, and he initially thought that it was due to not having a bowel movement, he states generally he is with loose stools, but states that he had a small bowel movement yesterday without blood. He denies any nausea or vomiting. He states he is still passing gas from below. He states he is a history of kidney stones, this does not feel the same. He states that the abdominal pain is crampy and dull/achy in the front, but however it does radiate to the back which is more sharp in nature. He states his urine has been more dark. He denies chest pain or shortness of breath. He currently rates his pain as a 7 out of 10. Due to his worsening pain, the patient did call his primary care doctor's office who referred him to the nurse triage line whom ultimately referred him to the emergency department for evaluation. Review of Systems     Review of Systems   Constitutional: Negative for activity change, appetite change, fatigue, fever and unexpected weight change. HENT: Negative for sore throat and trouble swallowing. Eyes: Negative for photophobia and visual disturbance. Respiratory: Negative for cough, shortness of breath and wheezing. Cardiovascular: Negative for chest pain. Gastrointestinal: Positive for abdominal pain and constipation. Negative for abdominal distention, blood in stool, diarrhea, nausea and vomiting.    Endocrine: Negative for polydipsia, polyphagia and polyuria. Genitourinary: Positive for flank pain. Negative for difficulty urinating, hematuria and urgency. Musculoskeletal: Positive for back pain. Skin: Negative for rash. Allergic/Immunologic: Negative for immunocompromised state. Neurological: Negative for weakness. Hematological: Does not bruise/bleed easily. Psychiatric/Behavioral: Negative for confusion. Past Medical, Surgical, Family, and Social History     He has a past medical history of Colorectal polyps, Hx of colonoscopy, Hyperlipidemia, and Hypothyroidism. He has a past surgical history that includes Hemorrhoid surgery (1961); hernia repair (1990; 2003); and Colonoscopy (2000,10/30/2012). His family history includes Cancer in his sister; Cancer (age of onset: 29) in his sister; Cancer (age of onset: 43) in his sister; Cancer (age of onset: 67) in his brother; Heart Disease in his sister. He reports that he has never smoked. He has never used smokeless tobacco. He reports previous alcohol use. He reports that he does not use drugs. Medications     Previous Medications    LEVOTHYROXINE (SYNTHROID) 125 MCG TABLET    TAKE 1 TABLET BY MOUTH EVERY DAY    NAPROXEN (NAPROSYN) 500 MG TABLET    Take 1 tablet by mouth 2 times daily (with meals)    SILDENAFIL (VIAGRA) 100 MG TABLET    Take 1 tablet by mouth as needed for Erectile Dysfunction       Allergies     He has No Known Allergies. Physical Exam     INITIAL VITALS: BP: (!) 150/85, Temp: 97.7 °F (36.5 °C), Pulse: 62, Resp: 18, SpO2: 95 %   Physical Exam  Vitals signs and nursing note reviewed. Constitutional:       Appearance: He is not ill-appearing, toxic-appearing or diaphoretic. Comments: He is in moderate distress secondary to pain although he is not writhing. HENT:      Head: Normocephalic. Mouth/Throat:      Mouth: Mucous membranes are moist.   Eyes:      Pupils: Pupils are equal, round, and reactive to light.    Neck:      Musculoskeletal: Normal range of motion. No neck rigidity. Cardiovascular:      Rate and Rhythm: Normal rate and regular rhythm. Pulses: Normal pulses. Heart sounds: Normal heart sounds. Pulmonary:      Effort: Pulmonary effort is normal.      Breath sounds: Normal breath sounds. Abdominal:      General: Bowel sounds are normal. There is no distension. Tenderness: There is abdominal tenderness. There is guarding. There is no right CVA tenderness, left CVA tenderness or rebound. Negative signs include James's sign. Musculoskeletal: Normal range of motion. Skin:     General: Skin is warm. Capillary Refill: Capillary refill takes less than 2 seconds. Neurological:      General: No focal deficit present. Mental Status: He is alert. Psychiatric:         Mood and Affect: Mood normal.         Diagnostic Results     EKG   Not performed. RADIOLOGY:  CT ABDOMEN PELVIS W IV CONTRAST Additional Contrast? None   Final Result      Moderate right-sided hydronephrosis and hydroureter caused by an obstructing 4.5 mm calculus in the right distal ureter. Perinephric fluid is seen on the right and overlying infection is not excluded. Small hiatal hernia.       Left renal cysts      Uncomplicated colonic diverticulosis          LABS:   Results for orders placed or performed during the hospital encounter of 11/21/20   CBC Auto Differential   Result Value Ref Range    WBC 5.7 4.0 - 11.0 K/uL    RBC 4.76 4.20 - 5.90 M/uL    Hemoglobin 15.0 13.5 - 17.5 g/dL    Hematocrit 44.9 40.5 - 52.5 %    MCV 94.4 80.0 - 100.0 fL    MCH 31.6 26.0 - 34.0 pg    MCHC 33.5 31.0 - 36.0 g/dL    RDW 13.6 12.4 - 15.4 %    Platelets 381 161 - 308 K/uL    MPV 8.3 5.0 - 10.5 fL    Neutrophils % 69.5 %    Lymphocytes % 19.9 %    Monocytes % 6.7 %    Eosinophils % 2.1 %    Basophils % 1.8 %    Neutrophils Absolute 3.9 1.7 - 7.7 K/uL    Lymphocytes Absolute 1.1 1.0 - 5.1 K/uL    Monocytes Absolute 0.4 0.0 - 1.3 K/uL    Eosinophils Absolute 0.1 0.0 - 0.6 K/uL    Basophils Absolute 0.1 0.0 - 0.2 K/uL   Comprehensive Metabolic Panel w/ Reflex to MG   Result Value Ref Range    Sodium 139 136 - 145 mmol/L    Potassium reflex Magnesium 4.6 3.5 - 5.1 mmol/L    Chloride 105 99 - 110 mmol/L    CO2 22 21 - 32 mmol/L    Anion Gap 12 3 - 16    Glucose 114 (H) 70 - 99 mg/dL    BUN 15 7 - 20 mg/dL    CREATININE 1.1 0.8 - 1.3 mg/dL    GFR Non-African American >60 >60    GFR African American >60 >60    Calcium 8.8 8.3 - 10.6 mg/dL    Total Protein 6.7 6.4 - 8.2 g/dL    Alb 4.2 3.4 - 5.0 g/dL    Albumin/Globulin Ratio 1.7 1.1 - 2.2    Total Bilirubin 0.4 0.0 - 1.0 mg/dL    Alkaline Phosphatase 51 40 - 129 U/L    ALT 13 10 - 40 U/L    AST 22 15 - 37 U/L    Globulin 2.5 g/dL   Lipase   Result Value Ref Range    Lipase 22.0 13.0 - 60.0 U/L   Troponin   Result Value Ref Range    Troponin <0.01 <0.01 ng/mL   Urinalysis Reflex to Culture    Specimen: Urine, clean catch   Result Value Ref Range    Color, UA Yellow Straw/Yellow    Clarity, UA Clear Clear    Glucose, Ur Negative Negative mg/dL    Bilirubin Urine Negative Negative    Ketones, Urine Negative Negative mg/dL    Specific Gravity, UA >=1.030 1.005 - 1.030    Blood, Urine LARGE (A) Negative    pH, UA 5.5 5.0 - 8.0    Protein, UA TRACE (A) Negative mg/dL    Urobilinogen, Urine 0.2 <2.0 E.U./dL    Nitrite, Urine Negative Negative    Leukocyte Esterase, Urine Negative Negative    Microscopic Examination YES     Urine Type NotGiven     Urine Reflex to Culture Not Indicated    Lactic Acid, Plasma   Result Value Ref Range    Lactic Acid 2.5 (H) 0.4 - 2.0 mmol/L   Microscopic Urinalysis   Result Value Ref Range    Mucus, UA 2+ (A) None Seen /LPF    WBC, UA 0-2 0 - 5 /HPF    RBC, UA 21-50 (A) 0 - 4 /HPF    Epithelial Cells, UA 2-5 0 - 5 /HPF       ED BEDSIDE ULTRASOUND:  Not performed    RECENT VITALS:  BP: (!) 150/85,Temp: 97.7 °F (36.5 °C), Pulse: 62, Resp: 18, SpO2: 95 %     Procedures     None.     ED Course     Nursing Notes, antiemetics, IV analgesia, IV fluid, CT scan of the abdomen pelvis. Reassess. Medical Decision Making: This is a pleasant 58-year-old male that came in today for evaluation of right-sided flank pain and abdominal pain. Work-up in the emergency department consisted of labs, urinalysis, and CT scan of the abdomen and pelvis. Labs were unremarkable, however there is a slightly elevated lactate of 2.5 that was treated with IV fluids. Urinalysis showed no signs of infection but did have some blood. This is consistent with the CT finding of kidney stone. His kidney stone was less than 5 mm, patient was given Toradol with improvement of his symptoms. He had a discussion with the patient about disposition: Admission for urology consultation/stent placement versus discharge home for expectant management. Given that the patient is well-appearing, has a safe ride home, and his pain is under control he elected to go home. The patient was told to take Toradol as needed for pain and was given a prescription for Percocet with narcotic warnings for breakthrough pain. Patient was also given a short course of Flomax. Patient was told to call his primary care physician and/or urology for an appointment to follow-up not only on his kidney stone but for his enlarged prostate. Patient was instructed to return back to the emergency department if he had worsening pain, inability to eat or drink, or developed a fever. He demonstrated understanding of these instructions and is stable now for discharge home. Clinical Impression     1. Renal colic    2. Renal cyst, left    3.  Enlarged prostate        Disposition     PATIENT REFERRED TO:  Tino Rawls MD  [address]  [address] [address] [place] [address]          Jimena Ray  [facility]  [address] [place] [id number] [phone]      Please call to discuss your kidney stones and enlarged prostate      DISCHARGE MEDICATIONS:  New Prescriptions    KETOROLAC (TORADOL) 10 MG TABLET    Take 1 tablet by mouth every 6 hours as needed for Pain    ONDANSETRON (ZOFRAN ODT) 4 MG DISINTEGRATING TABLET    Take 1 tablet by mouth every 8 hours as needed for Nausea    OXYCODONE-ACETAMINOPHEN (PERCOCET) 5-325 MG PER TABLET    Take 1 tablet by mouth every 6 hours as needed for Pain for up to 3 days. Intended supply: 3 days.  Take lowest dose possible to manage pain    TAMSULOSIN (FLOMAX) 0.4 MG CAPSULE    Take 1 capsule by mouth daily for 5 doses       DISPOSITION Decision To Discharge 11/21/2020 01:30:39 PM          Fabienne Salguero MD  11/21/20 9550

## 2020-12-01 ENCOUNTER — TELEPHONE (OUTPATIENT)
Dept: FAMILY MEDICINE CLINIC | Age: 70
End: 2020-12-01

## 2021-01-15 RX ORDER — LEVOTHYROXINE SODIUM 0.12 MG/1
TABLET ORAL
Qty: 90 TABLET | Refills: 3 | Status: SHIPPED | OUTPATIENT
Start: 2021-01-15 | End: 2022-01-09

## 2021-04-16 ASSESSMENT — LIFESTYLE VARIABLES
HOW OFTEN DO YOU HAVE SIX OR MORE DRINKS ON ONE OCCASION: 0
AUDIT-C TOTAL SCORE: 0
HOW OFTEN DURING THE LAST YEAR HAVE YOU NEEDED AN ALCOHOLIC DRINK FIRST THING IN THE MORNING TO GET YOURSELF GOING AFTER A NIGHT OF HEAVY DRINKING: 0
AUDIT-C TOTAL SCORE: 1
HOW OFTEN DURING THE LAST YEAR HAVE YOU BEEN UNABLE TO REMEMBER WHAT HAPPENED THE NIGHT BEFORE BECAUSE YOU HAD BEEN DRINKING: NEVER
HOW OFTEN DURING THE LAST YEAR HAVE YOU HAD A FEELING OF GUILT OR REMORSE AFTER DRINKING: NEVER
HOW MANY STANDARD DRINKS CONTAINING ALCOHOL DO YOU HAVE ON A TYPICAL DAY: 0
HAS A RELATIVE, FRIEND, DOCTOR, OR ANOTHER HEALTH PROFESSIONAL EXPRESSED CONCERN ABOUT YOUR DRINKING OR SUGGESTED YOU CUT DOWN: NO
HOW OFTEN DO YOU HAVE SIX OR MORE DRINKS ON ONE OCCASION: NEVER
HOW MANY STANDARD DRINKS CONTAINING ALCOHOL DO YOU HAVE ON A TYPICAL DAY: ONE OR TWO
HOW OFTEN DURING THE LAST YEAR HAVE YOU FAILED TO DO WHAT WAS NORMALLY EXPECTED FROM YOU BECAUSE OF DRINKING: NEVER
HOW OFTEN DURING THE LAST YEAR HAVE YOU NEEDED AN ALCOHOLIC DRINK FIRST THING IN THE MORNING TO GET YOURSELF GOING AFTER A NIGHT OF HEAVY DRINKING: NEVER
HOW OFTEN DURING THE LAST YEAR HAVE YOU BEEN UNABLE TO REMEMBER WHAT HAPPENED THE NIGHT BEFORE BECAUSE YOU HAD BEEN DRINKING: 0
HOW OFTEN DO YOU HAVE A DRINK CONTAINING ALCOHOL: MONTHLY OR LESS
HOW OFTEN DURING THE LAST YEAR HAVE YOU FAILED TO DO WHAT WAS NORMALLY EXPECTED FROM YOU BECAUSE OF DRINKING: 0
HOW OFTEN DURING THE LAST YEAR HAVE YOU FOUND THAT YOU WERE NOT ABLE TO STOP DRINKING ONCE YOU HAD STARTED: NEVER
AUDIT TOTAL SCORE: 0
HOW OFTEN DURING THE LAST YEAR HAVE YOU HAD A FEELING OF GUILT OR REMORSE AFTER DRINKING: 0
HAVE YOU OR SOMEONE ELSE BEEN INJURED AS A RESULT OF YOUR DRINKING: NO

## 2021-04-16 ASSESSMENT — PATIENT HEALTH QUESTIONNAIRE - PHQ9
SUM OF ALL RESPONSES TO PHQ9 QUESTIONS 1 & 2: 0
2. FEELING DOWN, DEPRESSED OR HOPELESS: 0

## 2021-04-21 ENCOUNTER — OFFICE VISIT (OUTPATIENT)
Dept: DERMATOLOGY | Age: 71
End: 2021-04-21
Payer: MEDICARE

## 2021-04-21 VITALS — TEMPERATURE: 97.2 F

## 2021-04-21 DIAGNOSIS — D18.01 CHERRY ANGIOMA: ICD-10-CM

## 2021-04-21 DIAGNOSIS — L90.5 SCAR CONDITION AND FIBROSIS OF SKIN: ICD-10-CM

## 2021-04-21 DIAGNOSIS — D48.5 NEOPLASM OF UNCERTAIN BEHAVIOR OF SKIN: ICD-10-CM

## 2021-04-21 DIAGNOSIS — D23.9 DILATED PORE OF WINER: ICD-10-CM

## 2021-04-21 DIAGNOSIS — L81.4 SOLAR LENTIGINOSIS: ICD-10-CM

## 2021-04-21 DIAGNOSIS — L82.1 SEBORRHEIC KERATOSIS: ICD-10-CM

## 2021-04-21 DIAGNOSIS — Z85.828 HISTORY OF NONMELANOMA SKIN CANCER: ICD-10-CM

## 2021-04-21 DIAGNOSIS — L57.0 ACTINIC KERATOSIS: Primary | ICD-10-CM

## 2021-04-21 DIAGNOSIS — D22.9 MULTIPLE BENIGN MELANOCYTIC NEVI: ICD-10-CM

## 2021-04-21 PROCEDURE — 99214 OFFICE O/P EST MOD 30 MIN: CPT | Performed by: DERMATOLOGY

## 2021-04-21 PROCEDURE — G8417 CALC BMI ABV UP PARAM F/U: HCPCS | Performed by: DERMATOLOGY

## 2021-04-21 PROCEDURE — 1123F ACP DISCUSS/DSCN MKR DOCD: CPT | Performed by: DERMATOLOGY

## 2021-04-21 PROCEDURE — 1036F TOBACCO NON-USER: CPT | Performed by: DERMATOLOGY

## 2021-04-21 PROCEDURE — 3017F COLORECTAL CA SCREEN DOC REV: CPT | Performed by: DERMATOLOGY

## 2021-04-21 PROCEDURE — 4040F PNEUMOC VAC/ADMIN/RCVD: CPT | Performed by: DERMATOLOGY

## 2021-04-21 PROCEDURE — G8427 DOCREV CUR MEDS BY ELIG CLIN: HCPCS | Performed by: DERMATOLOGY

## 2021-04-21 NOTE — PATIENT INSTRUCTIONS
Skin Cancer Prevention: After Your Visit    Skin cancer is the abnormal growth of cells in the skin. It usually appears as a growth that changes in color, shape, or size. This can be a sore that does not heal or a change in a wart or a mole. Skin cancer is almost always curable when found early and treated. So it is important to see your doctor if you have any of these changes in your skin. Skin cancer is the most common type of cancer. It often appears on areas of the body that have been exposed to the sun, such as the head, face, neck, back, chest, or shoulders. Follow-up care is a key part of your treatment and safety. Be sure to make and go to all appointments, and call your doctor if you are having problems. It's also a good idea to know your test results and keep a list of the medicines you take. How can you care for yourself at home?  - Wear a wide-brimmed hat and long sleeves and pants if you are going to be outdoors for a long time. - Avoid the sun between 10 a.m. and 4 p.m., which is the peak time for UV rays. - Wear sunscreen on exposed skin. Make sure the sunscreen blocks ultraviolet rays (both UVA and UVB) and has a sun protection factor (SPF) of at least 30. Use it every day, even when it is cloudy. Some doctors may recommend a higher SPF, such as 48.  - Do not use tanning booths or sunlamps. - Use lip balm or cream that has sun protection factor (SPF) to protect your lips from getting sunburned or getting cold sores. - Wear sunglasses that block UV rays. When should you call for help? Watch closely for changes in your health, and be sure to contact your doctor if:  - You are concerned about any problem areas on your skin. - You notice a change in a mole or skin growth. For example:  a. It gets bigger. b. It develops uneven borders. c. It gets thicker, raised, or worn down. d. It changes color. e. It starts to bleed easily.       A-B-C-D-E guide for Melanoma     Characteristics of unusual moles that may indicate melanomas or other skin cancers follow the A-B-C-D-E guide developed by the [employer]:  2. A is for asymmetrical shape. Look for moles with irregular shapes, such as two very different-looking halves. 3. B is for irregular border. Look for moles with irregular, notched or scalloped borders, these may be characteristics of melanomas. 4. C is for changes in color. Look for growths that have many colors or an uneven distribution of color. 5. D is for diameter. Look for new growth in a mole larger than about 1/4 inch (6 millimeters) or about the size of a pencil eraser. 6. E is for evolving. This is the most important one. Look for changes over time, such as a mole that grows in size or that changes color or shape. Joellen Hebert may also evolve to develop new signs and symptoms, such as new itchiness or bleeding. If any of your moles appear to be changing, see your doctor. Other suspicious changes in a mole may include: Scaly skin, itching, spreading of color from the mole into the surrounding skin, oozing or bleeding. Cancerous (malignant) moles vary greatly in appearance. Some may show all of the changes listed above, while others may have only one or two unusual characteristics. Please call and schedule an appointment if you have any concerns or questions. Dr. Robert Atkins [employer] Safe Strategies:    1. Avoid the sun if possible especially between the hours of 10 am and 4 pm. That's when the sun's most harmful UV rays are hitting the earth. 2. Use protective clothing. Just like the right equipment helps you perform better in your sport, the proper clothing can do a lot to help us in our fight to prevent skin cancer. Thicker and darker clothing with a tighter weave will block more of the suns harmful rays, and it never wears off! Make wearing long sleeves, a broad brimmed hat and sun glasses part of your routine.     3. Use a broad spectrum sunscreen with

## 2021-04-23 ENCOUNTER — OFFICE VISIT (OUTPATIENT)
Dept: FAMILY MEDICINE CLINIC | Age: 71
End: 2021-04-23
Payer: MEDICARE

## 2021-04-23 VITALS
HEART RATE: 88 BPM | OXYGEN SATURATION: 96 % | HEIGHT: 72 IN | BODY MASS INDEX: 26.33 KG/M2 | SYSTOLIC BLOOD PRESSURE: 110 MMHG | DIASTOLIC BLOOD PRESSURE: 70 MMHG | WEIGHT: 194.4 LBS

## 2021-04-23 DIAGNOSIS — M79.89 RIGHT LEG SWELLING: ICD-10-CM

## 2021-04-23 DIAGNOSIS — Z00.00 ROUTINE GENERAL MEDICAL EXAMINATION AT A HEALTH CARE FACILITY: ICD-10-CM

## 2021-04-23 DIAGNOSIS — E78.2 MIXED HYPERLIPIDEMIA: ICD-10-CM

## 2021-04-23 DIAGNOSIS — Z00.00 WELL ADULT EXAM: Primary | ICD-10-CM

## 2021-04-23 DIAGNOSIS — E03.9 HYPOTHYROIDISM, UNSPECIFIED TYPE: ICD-10-CM

## 2021-04-23 DIAGNOSIS — N52.8 OTHER MALE ERECTILE DYSFUNCTION: ICD-10-CM

## 2021-04-23 PROCEDURE — 1123F ACP DISCUSS/DSCN MKR DOCD: CPT | Performed by: FAMILY MEDICINE

## 2021-04-23 PROCEDURE — 3017F COLORECTAL CA SCREEN DOC REV: CPT | Performed by: FAMILY MEDICINE

## 2021-04-23 PROCEDURE — G0439 PPPS, SUBSEQ VISIT: HCPCS | Performed by: FAMILY MEDICINE

## 2021-04-23 PROCEDURE — 4040F PNEUMOC VAC/ADMIN/RCVD: CPT | Performed by: FAMILY MEDICINE

## 2021-04-23 RX ORDER — SILDENAFIL 100 MG/1
100 TABLET, FILM COATED ORAL PRN
Qty: 10 TABLET | Refills: 3 | Status: SHIPPED | OUTPATIENT
Start: 2021-04-23 | End: 2022-03-22

## 2021-04-23 NOTE — PATIENT INSTRUCTIONS
Personalized Preventive Plan for Lissy Mendoza - 4/23/2021  Medicare offers a range of preventive health benefits. Some of the tests and screenings are paid in full while other may be subject to a deductible, co-insurance, and/or copay. Some of these benefits include a comprehensive review of your medical history including lifestyle, illnesses that may run in your family, and various assessments and screenings as appropriate. After reviewing your medical record and screening and assessments performed today your provider may have ordered immunizations, labs, imaging, and/or referrals for you. A list of these orders (if applicable) as well as your Preventive Care list are included within your After Visit Summary for your review. Other Preventive Recommendations:    · A preventive eye exam performed by an eye specialist is recommended every 1-2 years to screen for glaucoma; cataracts, macular degeneration, and other eye disorders. · A preventive dental visit is recommended every 6 months. · Try to get at least 150 minutes of exercise per week or 10,000 steps per day on a pedometer . · Order or download the FREE \"Exercise & Physical Activity: Your Everyday Guide\" from [employer]. Call 3-944.894.4282 or search [employer] online. · You need 3305-5730 mg of calcium and 4822-0356 IU of vitamin D per day. It is possible to meet your calcium requirement with diet alone, but a vitamin D supplement is usually necessary to meet this goal.  · When exposed to the sun, use a sunscreen that protects against both UVA and UVB radiation with an SPF of 30 or greater. Reapply every 2 to 3 hours or after sweating, drying off with a towel, or swimming. · Always wear a seat belt when traveling in a car. Always wear a helmet when riding a bicycle or motorcycle.

## 2021-04-23 NOTE — PROGRESS NOTES
Medicare Annual Wellness Visit  Name: Violetta Perez Date: 2021   MRN: <[id number]> Sex: Male   Age: 66 y.o. Ethnicity: Non-/Non    : 1950 Race: Miki Newton is here for Medicare AWV    Screenings for behavioral, psychosocial and functional/safety risks, and cognitive dysfunction are all negative except as indicated below. These results, as well as other patient data from the [address] form, are documented in Flowsheets linked to this Encounter. No Known Allergies      Prior to Visit Medications    Medication Sig Taking?  Authorizing Provider   sildenafil (VIAGRA) 100 MG tablet Take 1 tablet by mouth as needed for Erectile Dysfunction Yes Amanda Dodson MD   Tetanus-Diphth-Acell Pertussis (BOOSTRIX) 5-2.5-18.5 LF-MCG/0.5 injection Inject 0.5 mLs into the muscle once for 1 dose Yes Amanda Dodson MD   levothyroxine (SYNTHROID) 125 MCG tablet TAKE 1 TABLET BY MOUTH EVERY DAY Yes Amanda Dodson MD         Past Medical History:   Diagnosis Date    Colorectal polyps     Hx of colonoscopy 10/6/03    Hyperlipidemia 2010    Hypothyroidism        Past Surgical History:   Procedure Laterality Date    COLONOSCOPY  ,10/30/2012    neg  q10    [facility] [facility];          Family History   Problem Relation Age of Onset    Cancer Sister         breast    Heart Disease Sister         congenital heart disease    Cancer Sister 29        breast    Cancer Sister 43        breast    Cancer Brother 67        pancreatic--Whipples    Heart Defect Sister        CareTeam (Including outside providers/suppliers regularly involved in providing care):   Patient Care Team:  Amanda Dodson MD as PCP - General (Family Medicine)  Amanda Dodson MD as PCP - [facility] Empaneled Provider  MD Maliha Brown MD as Consulting Physician (Otolaryngology)    Wt Readings from Last 3 Encounters:   04/23/21 194 lb 6.4 oz (88.2 kg)   11/21/20 186 lb (84.4 kg)   03/03/20 184 lb 1.4 oz (83.5 kg)     Vitals:    04/23/21 1411   BP: 110/70   Pulse: 88   SpO2: 96%   Weight: 194 lb 6.4 oz (88.2 kg)   Height: 6' (1.829 m)     Body mass index is 26.37 kg/m². Based upon direct observation of the patient, evaluation of cognition reveals recent and remote memory intact. General Appearance: alert and oriented to person, place and time, well developed and well- nourished, in no acute distress  Skin: warm and dry, no rash or erythema  Head: normocephalic and atraumatic  Eyes: pupils equal, round, and reactive to light, extraocular eye movements intact, conjunctivae normal  ENT: tympanic membrane, external ear and ear canal normal bilaterally, nose without deformity, nasal mucosa and turbinates normal without polyps  Neck: supple and non-tender without mass, no thyromegaly or thyroid nodules, no cervical lymphadenopathy  Pulmonary/Chest: clear to auscultation bilaterally- no wheezes, rales or rhonchi, normal air movement, no respiratory distress  Cardiovascular: normal rate, regular rhythm, normal S1 and S2, no murmurs, rubs, clicks, or gallops, distal pulses intact, no carotid bruits  Abdomen: soft, non-tender, non-distended, normal bowel sounds, no masses or organomegaly  Genitourinary/Rectal: genitals normal without hernia or inguinal adenopathy, rectal normal without masses, prostate normal in size without masses or nodules  Extremities: no cyanosis, clubbing or edema  Musculoskeletal: normal range of motion, no joint swelling, deformity or tenderness--R leg swelling  Neurologic: reflexes normal and symmetric, no cranial nerve deficit, gait, coordination and speech normal    Patient's complete Health Risk Assessment and screening values have been reviewed and are found in Flowsheets. The following problems were reviewed today and where indicated follow up appointments were made and/or referrals ordered. Positive Risk Factor Screenings with Interventions:            General Health and ACP:  General  In general, how would you say your health is?: Excellent  In the past 7 days, have you experienced any of the following? New or Increased Pain, New or Increased Fatigue, Loneliness, Social Isolation, Stress or Anger?: None of These  Do you get the social and emotional support that you need?: Yes  Do you have a Living Will?: (!) No  Advance Directives     Power of  Living Will ACP-Advance Directive ACP-Power of     Not on File Filed on 07/07/13 Filed Not on File      General Health Risk Interventions:  · none        Personalized Preventive Plan   Current Health Maintenance Status  Immunization History   Administered Date(s) Administered    COVID-19, Moderna, PF, 100mcg/0.5mL 02/03/2021, 03/03/2021    Influenza, High Dose (Fluzone 65 yrs and older) 10/19/2015, 01/12/2018    Influenza, Intradermal, Preservative free 01/14/2013    Pneumococcal Conjugate 13-valent ([id number]) 03/27/2017    Pneumococcal Polysaccharide ([id number]) 03/03/2015    Tdap (Boostrix, Adacel) 12/21/2010    Zoster Recombinant (Shingrix) 11/09/2020, 01/07/2021        Health Maintenance   Topic Date Due    Annual Wellness Visit (AWV)  Never done    DTaP/Tdap/Td vaccine (2 - Td) 12/21/2020    TSH testing  02/07/2021    Colon cancer screen colonoscopy  10/30/2022    Lipid screen  07/08/2024    Flu vaccine  Completed    Shingles Vaccine  Completed    Pneumococcal 65+ years Vaccine  Completed    COVID-19 Vaccine  Completed    Hepatitis C screen  Completed    Hepatitis A vaccine  Aged Out    Hepatitis B vaccine  Aged Out    Hib vaccine  Aged Out    Meningococcal (ACWY) vaccine  Aged Out     Recommendations for [employer] Due: see orders and patient instructions/AVS.  .   Recommended screening schedule for the next 5-10 years is provided to the patient in written form: see Patient Instructions/AVS.    John Luna was

## 2021-04-26 ENCOUNTER — HOSPITAL ENCOUNTER (OUTPATIENT)
Dept: VASCULAR LAB | Age: 71
Discharge: HOME | End: 2021-04-26
Payer: MEDICARE

## 2021-04-26 DIAGNOSIS — M79.89 RIGHT LEG SWELLING: ICD-10-CM

## 2021-04-26 DIAGNOSIS — E78.2 MIXED HYPERLIPIDEMIA: ICD-10-CM

## 2021-04-26 DIAGNOSIS — E03.9 HYPOTHYROIDISM, UNSPECIFIED TYPE: ICD-10-CM

## 2021-04-26 LAB
A/G RATIO: 2 (ref 1.1–2.2)
ALBUMIN SERPL-MCNC: 4.2 G/DL (ref 3.4–5)
ALP BLD-CCNC: 48 U/L (ref 40–129)
ALT SERPL-CCNC: 28 U/L (ref 10–40)
ANION GAP SERPL CALCULATED.3IONS-SCNC: 8 MMOL/L (ref 3–16)
AST SERPL-CCNC: 20 U/L (ref 15–37)
BASOPHILS ABSOLUTE: 0 K/UL (ref 0–0.2)
BASOPHILS RELATIVE PERCENT: 0.7 %
BILIRUB SERPL-MCNC: 0.4 MG/DL (ref 0–1)
BUN BLDV-MCNC: 10 MG/DL (ref 7–20)
CALCIUM SERPL-MCNC: 8.8 MG/DL (ref 8.3–10.6)
CHLORIDE BLD-SCNC: 103 MMOL/L (ref 99–110)
CHOLESTEROL, TOTAL: 221 MG/DL (ref 0–199)
CO2: 27 MMOL/L (ref 21–32)
CREAT SERPL-MCNC: 1 MG/DL (ref 0.8–1.3)
EOSINOPHILS ABSOLUTE: 0.5 K/UL (ref 0–0.6)
EOSINOPHILS RELATIVE PERCENT: 8.1 %
GFR AFRICAN AMERICAN: >60
GFR NON-AFRICAN AMERICAN: >60
GLOBULIN: 2.1 G/DL
GLUCOSE BLD-MCNC: 82 MG/DL (ref 70–99)
HCT VFR BLD CALC: 45 % (ref 40.5–52.5)
HDLC SERPL-MCNC: 39 MG/DL (ref 40–60)
HEMOGLOBIN: 15 G/DL (ref 13.5–17.5)
LDL CHOLESTEROL CALCULATED: 132 MG/DL
LYMPHOCYTES ABSOLUTE: 1.7 K/UL (ref 1–5.1)
LYMPHOCYTES RELATIVE PERCENT: 28.5 %
MCH RBC QN AUTO: 31.8 PG (ref 26–34)
MCHC RBC AUTO-ENTMCNC: 33.4 G/DL (ref 31–36)
MCV RBC AUTO: 95 FL (ref 80–100)
MONOCYTES ABSOLUTE: 0.6 K/UL (ref 0–1.3)
MONOCYTES RELATIVE PERCENT: 10.5 %
NEUTROPHILS ABSOLUTE: 3 K/UL (ref 1.7–7.7)
NEUTROPHILS RELATIVE PERCENT: 52.2 %
PDW BLD-RTO: 13.9 % (ref 12.4–15.4)
PLATELET # BLD: 217 K/UL (ref 135–450)
PMV BLD AUTO: 8.7 FL (ref 5–10.5)
POTASSIUM SERPL-SCNC: 4.3 MMOL/L (ref 3.5–5.1)
RBC # BLD: 4.73 M/UL (ref 4.2–5.9)
SODIUM BLD-SCNC: 138 MMOL/L (ref 136–145)
TOTAL PROTEIN: 6.3 G/DL (ref 6.4–8.2)
TRIGL SERPL-MCNC: 249 MG/DL (ref 0–150)
TSH SERPL DL<=0.05 MIU/L-ACNC: 4.84 UIU/ML (ref 0.27–4.2)
VLDLC SERPL CALC-MCNC: 50 MG/DL
WBC # BLD: 5.8 K/UL (ref 4–11)

## 2021-04-26 PROCEDURE — 93971 EXTREMITY STUDY: CPT

## 2021-04-27 ENCOUNTER — TELEPHONE (OUTPATIENT)
Dept: FAMILY MEDICINE CLINIC | Age: 71
End: 2021-04-27

## 2021-05-03 DIAGNOSIS — E03.9 HYPOTHYROIDISM, UNSPECIFIED TYPE: ICD-10-CM

## 2021-05-03 LAB
T4 FREE: 1.4 NG/DL (ref 0.9–1.8)
TSH SERPL DL<=0.05 MIU/L-ACNC: 2.3 UIU/ML (ref 0.27–4.2)

## 2021-05-04 ENCOUNTER — TELEPHONE (OUTPATIENT)
Dept: DERMATOLOGY | Age: 71
End: 2021-05-04

## 2021-05-04 NOTE — TELEPHONE ENCOUNTER
The patient is called and he is waiting is waiting for someone to call him about setting up light therapy for face and head. Can someone help him schedule blue light?

## 2021-05-11 ENCOUNTER — OFFICE VISIT (OUTPATIENT)
Dept: DERMATOLOGY | Age: 71
End: 2021-05-11
Payer: MEDICARE

## 2021-05-11 VITALS — TEMPERATURE: 97.7 F

## 2021-05-11 DIAGNOSIS — D48.5 NEOPLASM OF UNCERTAIN BEHAVIOR OF SKIN: Primary | ICD-10-CM

## 2021-05-11 DIAGNOSIS — L57.8 ACTINODERMATOSIS: ICD-10-CM

## 2021-05-11 PROCEDURE — G8417 CALC BMI ABV UP PARAM F/U: HCPCS | Performed by: DERMATOLOGY

## 2021-05-11 PROCEDURE — 99213 OFFICE O/P EST LOW 20 MIN: CPT | Performed by: DERMATOLOGY

## 2021-05-11 PROCEDURE — 4040F PNEUMOC VAC/ADMIN/RCVD: CPT | Performed by: DERMATOLOGY

## 2021-05-11 PROCEDURE — 3017F COLORECTAL CA SCREEN DOC REV: CPT | Performed by: DERMATOLOGY

## 2021-05-11 PROCEDURE — G8427 DOCREV CUR MEDS BY ELIG CLIN: HCPCS | Performed by: DERMATOLOGY

## 2021-05-11 PROCEDURE — 1036F TOBACCO NON-USER: CPT | Performed by: DERMATOLOGY

## 2021-05-11 PROCEDURE — 1123F ACP DISCUSS/DSCN MKR DOCD: CPT | Performed by: DERMATOLOGY

## 2021-05-11 NOTE — PROGRESS NOTES
Patient's Name: Renne Boas  MRN: <[id number]>  YOB: 1950  Date of Visit: 5/11/2021  Primary Care Provider: Jacinta Horn MD  Referring Provider: No ref. provider found    Subjective:   Chief Complaint:   Follow-up (Lesion on shoulder.)      History of Present Illness:   Renne Boas is a 66 y.o. male with h/o NMSC who presents in clinic today for follow up on lesions on his torso. Patient is unsure if lesions are still present. He denies any bleeding, non healing or changing lesions. Last office visit: 4/21/21      Past medical and surgical histories, current medications, allergies, social and family histories were reviewed with no change since the last visit        Allergies:  No Known Allergies    Current Medications:  Current Outpatient Medications   Medication Sig Dispense Refill    levothyroxine (SYNTHROID) 125 MCG tablet TAKE 1 TABLET BY MOUTH EVERY DAY 90 tablet 3    sildenafil (VIAGRA) 100 MG tablet Take 1 tablet by mouth as needed for Erectile Dysfunction 10 tablet 3     No current facility-administered medications for this visit. Review of Systems:  Constitutional: No fevers, chills or recent illness. {  Skin: Skin:As per HPI AND otherwise no new, bleeding or symptomatic skin lesions      Objective:     Vitals:    05/11/21 0842   Temp: 97.7 °F (36.5 °C)       Physical Examination:  General: alert, comfortable, no apparent distress, well-appearing  Psych: alert, oriented and pleasant  Neuro: oriented to person, place, and time  Skin: Areas examined: head including face, lips, conjunctiva and lids, neck, hair/scalp, chest, including breasts and axilla, abdomen, back, right upper extremity, left upper extremity, left hand, right hand and digits and nails      All areas examined were within normal limits except those listed below with the appropriate assessment and plan    Assessment and Plan (with relevant objective exam findings):     1.  Neoplasm uncertain behavior, skin  Location: Lt anterior shoulder  Objective findings: faintly erythematous scaly papule  Ddx: Favor AK  Patient would like to wait and have lesion re-evaluated at his next follow up visit. Will also monitor the Rt posterior shoulder lesion    2. Actinodermatosis  In all photo-exposed areas there were numerous light brown, lacy, 3-6 mm macules and some mottled hypo- and hyperpigmentation. This was most prominent on the face, anterior chest, and shoulders. The patient was reassured that these changes do not require treatment, but indicated a significant amount of sun damage in the patient's past. The patient was briefly counseled on the importance of sun protective habits and encouraged to be seen for follow up evaluations in the future. ABCDEs of melanoma were reviewed. Follow up:  Return visit in 6 weeks following PDT treatment or as needed for change in condition. All questions addressed.      Procedure:   No procedure performed          Dustin Andrade MD. MS

## 2021-05-19 ENCOUNTER — NURSE ONLY (OUTPATIENT)
Dept: DERMATOLOGY | Age: 71
End: 2021-05-19
Payer: MEDICARE

## 2021-05-19 VITALS — TEMPERATURE: 97.3 F

## 2021-05-19 DIAGNOSIS — L57.0 ACTINIC KERATOSIS: Primary | ICD-10-CM

## 2021-05-19 PROCEDURE — 96567 PDT DSTR PRMLG LES SKN: CPT | Performed by: DERMATOLOGY

## 2021-05-19 NOTE — PROGRESS NOTES
Photodynamic Therapy Procedure Note     Diagnosis: Actinic Keratoses    Location:    - Scalp + nose + right ear     Procedure: The sites of treatment were verified with the patient and the previous progress note. The area to be treated was cleansed with alcohol (alcohol or acetone). Yesenia Ganser was applied to the indicated area(s) above. 1.30 HRS (time in hours) later the patient was exposed to blue light via the Robson-U for 16 minutes and 40 seconds. 1 Levulan Kerastick(s) were used. [address] # [id number]    The patient was provided with appropriate eye protection. The patient tolerated the procedure well. There were no immediate complications. The patient was educated regarding the potential side effects and risks including burning, stinging, erythema, edema, crusting, and dyspigmentation. The patient was instructed to avoid sun or intense light for 48 hours after the treatment.

## 2021-07-06 ENCOUNTER — OFFICE VISIT (OUTPATIENT)
Dept: DERMATOLOGY | Age: 71
End: 2021-07-06
Payer: MEDICARE

## 2021-07-06 VITALS — TEMPERATURE: 98.4 F

## 2021-07-06 DIAGNOSIS — L82.0 INFLAMED SEBORRHEIC KERATOSIS: ICD-10-CM

## 2021-07-06 DIAGNOSIS — L57.0 ACTINIC KERATOSIS: Primary | ICD-10-CM

## 2021-07-06 DIAGNOSIS — L82.1 SEBORRHEIC KERATOSIS: ICD-10-CM

## 2021-07-06 PROCEDURE — G8427 DOCREV CUR MEDS BY ELIG CLIN: HCPCS | Performed by: DERMATOLOGY

## 2021-07-06 PROCEDURE — 17000 DESTRUCT PREMALG LESION: CPT | Performed by: DERMATOLOGY

## 2021-07-06 PROCEDURE — 3017F COLORECTAL CA SCREEN DOC REV: CPT | Performed by: DERMATOLOGY

## 2021-07-06 PROCEDURE — 17003 DESTRUCT PREMALG LES 2-14: CPT | Performed by: DERMATOLOGY

## 2021-07-06 PROCEDURE — 99213 OFFICE O/P EST LOW 20 MIN: CPT | Performed by: DERMATOLOGY

## 2021-07-06 PROCEDURE — 4040F PNEUMOC VAC/ADMIN/RCVD: CPT | Performed by: DERMATOLOGY

## 2021-07-06 PROCEDURE — 1036F TOBACCO NON-USER: CPT | Performed by: DERMATOLOGY

## 2021-07-06 PROCEDURE — G8417 CALC BMI ABV UP PARAM F/U: HCPCS | Performed by: DERMATOLOGY

## 2021-07-06 PROCEDURE — 1123F ACP DISCUSS/DSCN MKR DOCD: CPT | Performed by: DERMATOLOGY

## 2021-07-06 PROCEDURE — 17110 DESTRUCTION B9 LES UP TO 14: CPT | Performed by: DERMATOLOGY

## 2021-07-06 NOTE — PROGRESS NOTES
Patient's Name: Gaviota Zelaya  MRN: <[id number]>  YOB: 1950  Date of Visit: 7/6/2021  Primary Care Provider: Gerry Blair MD  Referring Provider: No ref. provider found    Subjective:     Chief Complaint   Patient presents with    Follow-up     Follow up on PDT        History of Present Illness:  Gaviota Zelaya a 66 y.o. male is a follow up patient to my practice. They were last seen in clinic on 5/11/21   He presents to clinic today for follow up on PDT and evaluation of lesions on face    He reports lesions on face have been scaly and rough. Denies any bleeding. The lesion on his Rt cheek has been traumatized by shaving. Denies any other new, non healing or bleeding lesions    Patient reports  a personal history of skin cancer. Patient denies  a personal history of melanoma. Patient denies  a family history of skin cancer. Patient reports  a history of blistering sunburns. Patient denies  a history of tanning bed use. Patient currently is compliant with using sun-protective measures. Past medical/surgical/family history reviewed with no changes since last visit on 4/21/21        Allergies:  No Known Allergies    Current Medications:  Current Outpatient Medications   Medication Sig Dispense Refill    levothyroxine (SYNTHROID) 125 MCG tablet TAKE 1 TABLET BY MOUTH EVERY DAY 90 tablet 3    sildenafil (VIAGRA) 100 MG tablet Take 1 tablet by mouth as needed for Erectile Dysfunction 10 tablet 3     No current facility-administered medications for this visit. Review of Systems:  Constitutional: No fevers, chills or recent illness.    Skin: Skin:As per HPI AND otherwise no new, bleeding or symptomatic skin lesions      Objective:     Vitals:    07/06/21 0803   Temp: 98.4 °F (36.9 °C)       Physical Examination:  General: alert, comfortable, no apparent distress, well-appearing  Psych: alert, oriented and pleasant  Neuro: oriented to person, place, and time  Skin: Areas examined: head including face, lips, conjunctiva and lids, neck, hair/scalp, left hand, right hand and digits and nails      All areas examined were within normal limits except those listed below with the appropriate assessment and plan    Assessment and Plan (with relevant objective exam findings):     1. Actinic Keratosis    Objective findings: erythematous, gritty, scaly keratotic macule (s), papules with slight hyperkeratosis located on: Rt tragus, Rt scapha, Rt forehead x 2 (eyebrow superior), Left forehead and inferior Lt nasolabial cheek    The patient was told that actinic keratosis(es) represent precancers of the skin that have a small chance of developing into squamous cell carcinoma. Discussed options for therapy and risks/benefits and alternatives with patient including Liquid nitrogen, photodynamic therapy, topical field therapy (fluorouracil, imiquimod, other), etc     Decision was made to do the following:  -LN2 to 5 lesions.  -Edu re: risk of blister formation, discomfort, scar, hypopigmentation. See procedure note below  -Edu re: relationship with chronic cumulative sun exposure, low premalignant potential.   Discussed wound care. -Reviewed sun protective behavior -- sun avoidance during the peak hours of the day, sun-protective clothing (including hat and sunglasses), sunscreen use (water resistant, broad spectrum, SPF at least 50, need for reapplication every 2 to 3 hours). 2. Inflamed seborrheic keratosis AND Other specified erythematous condition (L53.8)  Location: Rt buccal cheek  Objective findings: Waxy, keratotic brown  papules with erythema    Symptoms: tendency to be traumatized     Due to irritation and symptoms this is causing as described the decision was made that this was medically necessary to treat today. Discussed treatment options including Cryotherapy, shave removal, ED&C, or laser treatment with patient.  After discussing options, the decision was made to treat the ISK's with cryotherapy. See procedure note below. 3. Seborrheic Keratosis  Location: Lt temple  Objective: Waxy, stuck on keratotic brown  papule. - benign nature and no treatment needed      Follow up:  Return visit in 3 months for TBSE or as needed for change in condition. All questions addressed. Procedure:   Procedure: Cryotherapy  Location(s): Rt tragus, Rt scapha, Rt forehead x 2 (eyebrow superior), Left forehead and inferior Lt nasolabial cheek   Indication:  premalignant  Total Number of Lesions:  6    Risks, benefits and alternatives were explained and verbal informed consent was obtained. The area was not photographed. Each lesion was treated with cryotherapy, using liquid nitrogen, for 2 freeze cycles of approximately 10 seconds each. Skin was allowed to thaw completely before each cycle. The patient was advised to have wound examined if problems with healing occur, or evidence of infection develops. Wound care instructions were reviewed with the patient and He was advised that if the site is not completely resolved at follow-up, re-treatment with this or other method should be considered. The patient tolerated the procedure well without complications. Procedure: Cryotherapy  Location(s): Rt buccal cheek   Indication: ISK  Total Number of Lesions:  1    Risks, benefits and alternatives were explained and verbal informed consent was obtained. The area was not photographed. The lesion was treated with cryotherapy, using liquid nitrogen, for 2 freeze cycles of approximately 10 seconds each. Skin was allowed to thaw completely before each cycle. The patient was advised to have wound examined if problems with healing occur, or evidence of infection develops. Wound care instructions were reviewed with the patient and He was advised that if the site is not completely resolved at follow-up, re-treatment with this or other method should be considered.  The patient tolerated the procedure well without complications.                     Haider Laurent MD. MS

## 2021-10-04 ENCOUNTER — OFFICE VISIT (OUTPATIENT)
Dept: DERMATOLOGY | Age: 71
End: 2021-10-04
Payer: MEDICARE

## 2021-10-04 VITALS — TEMPERATURE: 97.3 F

## 2021-10-04 DIAGNOSIS — L57.8 ACTINODERMATOSIS: ICD-10-CM

## 2021-10-04 DIAGNOSIS — L81.4 SOLAR LENTIGINOSIS: ICD-10-CM

## 2021-10-04 DIAGNOSIS — L57.0 ACTINIC KERATOSIS: Primary | ICD-10-CM

## 2021-10-04 DIAGNOSIS — Z85.828 HISTORY OF NONMELANOMA SKIN CANCER: ICD-10-CM

## 2021-10-04 DIAGNOSIS — D18.01 CHERRY ANGIOMA: ICD-10-CM

## 2021-10-04 DIAGNOSIS — L82.1 SEBORRHEIC KERATOSIS: ICD-10-CM

## 2021-10-04 DIAGNOSIS — D22.9 MULTIPLE BENIGN MELANOCYTIC NEVI: ICD-10-CM

## 2021-10-04 PROCEDURE — 3017F COLORECTAL CA SCREEN DOC REV: CPT | Performed by: DERMATOLOGY

## 2021-10-04 PROCEDURE — 4040F PNEUMOC VAC/ADMIN/RCVD: CPT | Performed by: DERMATOLOGY

## 2021-10-04 PROCEDURE — 1036F TOBACCO NON-USER: CPT | Performed by: DERMATOLOGY

## 2021-10-04 PROCEDURE — G8417 CALC BMI ABV UP PARAM F/U: HCPCS | Performed by: DERMATOLOGY

## 2021-10-04 PROCEDURE — G8484 FLU IMMUNIZE NO ADMIN: HCPCS | Performed by: DERMATOLOGY

## 2021-10-04 PROCEDURE — 1123F ACP DISCUSS/DSCN MKR DOCD: CPT | Performed by: DERMATOLOGY

## 2021-10-04 PROCEDURE — 99214 OFFICE O/P EST MOD 30 MIN: CPT | Performed by: DERMATOLOGY

## 2021-10-04 PROCEDURE — G8427 DOCREV CUR MEDS BY ELIG CLIN: HCPCS | Performed by: DERMATOLOGY

## 2021-10-04 RX ORDER — FLUOROURACIL 50 MG/G
CREAM TOPICAL
Qty: 40 G | Refills: 1 | Status: SHIPPED | OUTPATIENT
Start: 2021-10-04 | End: 2022-02-24

## 2021-10-04 NOTE — PROGRESS NOTES
Patient's Name: Joanna Baron  MRN: <[id number]>  YOB: 1950  Date of Visit: 10/4/2021  Primary Care Provider: Florencio Lee MD  Referring Provider: No ref. provider found    Subjective:     Chief Complaint   Patient presents with    Follow-up     AK on face and TBSE        History of Present Illness:  Joanna Baron a 66 y.o. male with h/o NMSC and AK is a follow up patient to my practice. They were last seen in clinic on 7/6/21   He reports site treated with cryotherapy at his last visit was painful and appeared to be indented. He reports the area now appears to be improving, however he has been scrubbing it hard and was unsure if he should back off. Patient is interested in treating the red moles on his skin. Denies any pain or bleeding, just not liking the appearance. Patient reports  a personal history of skin cancer. Patient denies  a personal history of melanoma. Patient denies  a family history of skin cancer. Patient reports  a history of blistering sunburns. Patient denies  a history of tanning bed use. Patient currently is compliant with using sun-protective measures. Past medical/surgical/family history reviewed with no changes since last visit on 7/6/21        Allergies:  No Known Allergies    Current Medications:  Current Outpatient Medications   Medication Sig Dispense Refill    fluorouracil (EFUDEX) 5 % cream Apply twice daily to affected area areas for 2 to 3 weeks 40 g 1    sildenafil (VIAGRA) 100 MG tablet Take 1 tablet by mouth as needed for Erectile Dysfunction 10 tablet 3    levothyroxine (SYNTHROID) 125 MCG tablet TAKE 1 TABLET BY MOUTH EVERY DAY 90 tablet 3     No current facility-administered medications for this visit. Review of Systems:  Constitutional: No fevers, chills or recent illness.    Skin: Skin:As per HPI AND otherwise no new, bleeding or symptomatic skin lesions      Objective:     Vitals:    10/04/21 0922   Temp: 97.3 °F require no treatment. Removal is usually not done unless they bleed or are irritated, in which case it may be medically necessary and can be done with electrodesiccation, shave removal, or laser therapy. -Due to benign nature, no treatment is necessary. Reassurance and observation recommended. 3. Solar Lentiginosis    Location: sun exposed areas, most prominently on the face, forearms, neck, shoulders, upper chest and upper back      Objective: Numerous lacy brown macules ranging in size from 2-10 mm diameter. The lentigines seen today are benign in character, caused by the sun, and do not require treatment. However, they do occasionally transform into a malignancy, so the patient needs to monitor for changes. They were educated on what a suspicious change would include, change in size or color, and included education on the ABCDs of melanoma. 4. Multiple benign melanocytic nevi   Location: torso and extremities  Objective findings: multiple brown/pink macules and papules without abnormal findings or concerning findings on exam and dermatoscopy    -Counseled the patient that these are benign and need no therapy. Observation for any changes recommended       5. Seborrheic Keratosis  Location: chest, back, flanks, dorsal hands  Objective: Waxy, stuck on keratotic brown  papules. - Discussed the benign nature of these lesions and that there is no malignant potential.  Treatment is destructive or removal which would be considered cosmetic and not covered by insurance, unless symptomatic. 6. Actinodermatosis  In all photo-exposed areas there were numerous light brown, lacy, 3-6 mm macules and some mottled hypo- and hyperpigmentation. This was most prominent on the face, anterior chest, and shoulders.      The patient was reassured that these changes do not require treatment, but indicated a significant amount of sun damage in the patient's past. The patient was briefly counseled on the importance of sun protective habits and encouraged to be seen for follow up evaluations in the future. ABCDEs of melanoma were reviewed. 7. History of nonmelanoma skin cancer  Location: Rt lateral scapula, Rt upper medial chest and nasal dorsum  Objective findings: scars without signs of recurrence     Recommendation was made for sun avoidance, use of protective clothing and daily use of broad spectrum sunscreen containing avobenzone, titanium, or zinc with SPF 30 or higher and yearly full skin exams with a dermatologist.  Also instructed to do self skin checks each month, spouse/partner skin checks on birthdays, big holidays and anniversaries, and return to clinic sooner than a year if any new, changing, or concerning spots are identified. I spent 37 minutes face-to face with the patient today, greater than 50% of which was spent in counseling and/or coordination of care. Follow up:  Return visit in 3 months for efudex f/u or as needed for change in condition. All questions addressed.      Procedure:   No procedure performed          Aidee Park MD. MS

## 2021-10-04 NOTE — PATIENT INSTRUCTIONS
Instructions for use of Fluorouracil    INDICATION: This cream is used to treat pre-cancerous spots (aka actinic keratoses), early basal and squamous cell skin cancers,  and warts. DIRECTIONS:  1. Fluorouracil cream comes in a tube of cream.    2. Apply it only to the area(s) indicated by your physician. Spread a thin layer of the cream over the entire area. Dont contact the eyes, nose or mouth. Do not spot treat or cover the treated areas with a bandage or dressing unless your doctor tells you to do so. TWICE DAILY FOR 2-3 WEEKS,   3. Some redness and irritation is expected (SUNBURN LIKE). If your skin gets too uncomfortable or painful, it is okay to stop for one week. Start again the next week until the treatment course is completed. 4. The redness will gradually fade over the following weeks, and a topical steroid may be prescribed to speed healing. CALL THE OFFICE IF NEEDED    5. Do everything possible to stay out of the sun. Wear sunscreen with SPF 50 or higher each day and long sleeves and a hat when you go outside. 6. Be sure to wash your hands thoroughly after each application so you will not get any in your eyes, nose, etc.  Keep the packets out of reach of children. 7. Return 2-3 months after your treatment is complete for your doctor to evaluate the areas. You should also get a skin check every 6 to 12 months as determined by your doctor to check for any new lesions. AREAS TO TREAT:  Scalp, forehead, cheeks, nose      Plan for about a 2-4 week course, but this can vary depending on thickness of lesions and may be longer course for thicker lesion areas. Side effects to expect: redness, crustiness, pain, tenderness, oozing of lesions. When you've achieved this goal response, STOP the cream. Best to stay out of sun when treating and healing; advise wearing sunscreen if possible.       I would take it to the point you get uncomfortable and then ok to stop and keep wounds dressed with vaseline to promote healing.

## 2021-10-21 ENCOUNTER — TELEPHONE (OUTPATIENT)
Dept: DERMATOLOGY | Age: 71
End: 2021-10-21

## 2021-10-21 NOTE — TELEPHONE ENCOUNTER
Pt calling have some questions about medication fluorouracil pls return call back @ [phone] to discuss

## 2021-10-21 NOTE — TELEPHONE ENCOUNTER
Called and spoke with patient. Advised patient that he is to refer to the AVS for the directions when applying the Efudex cream. Advised patient that the response varies by patient, site and thickness of the lesion. Advised patient that he needs to continue until the goal is reached. Patient understood and I advised patient to call back if he has any other questions.

## 2021-10-21 NOTE — TELEPHONE ENCOUNTER
Called and spoke with patient. Patient has been applying the efudex cream for 5-6 days. The locations are the scalp, forehead, temples, nose and cheeks. Patient stated that he has not experienced and burning or peeling as of day 5-6. He would like to know if it takes this long to see the changes from applying the cream. I advised the patient that I would inform  and would be back in contact with him.

## 2022-01-09 RX ORDER — LEVOTHYROXINE SODIUM 0.12 MG/1
TABLET ORAL
Qty: 90 TABLET | Refills: 3 | Status: SHIPPED | OUTPATIENT
Start: 2022-01-09

## 2022-01-10 ENCOUNTER — OFFICE VISIT (OUTPATIENT)
Dept: DERMATOLOGY | Age: 72
End: 2022-01-10
Payer: MEDICARE

## 2022-01-10 VITALS — TEMPERATURE: 97.3 F

## 2022-01-10 DIAGNOSIS — L57.0 ACTINIC KERATOSIS TREATED WITH TOPICAL FLUOROURACIL (5FU): Primary | ICD-10-CM

## 2022-01-10 PROCEDURE — 4040F PNEUMOC VAC/ADMIN/RCVD: CPT | Performed by: DERMATOLOGY

## 2022-01-10 PROCEDURE — 1036F TOBACCO NON-USER: CPT | Performed by: DERMATOLOGY

## 2022-01-10 PROCEDURE — G8484 FLU IMMUNIZE NO ADMIN: HCPCS | Performed by: DERMATOLOGY

## 2022-01-10 PROCEDURE — G8427 DOCREV CUR MEDS BY ELIG CLIN: HCPCS | Performed by: DERMATOLOGY

## 2022-01-10 PROCEDURE — 99213 OFFICE O/P EST LOW 20 MIN: CPT | Performed by: DERMATOLOGY

## 2022-01-10 PROCEDURE — G8417 CALC BMI ABV UP PARAM F/U: HCPCS | Performed by: DERMATOLOGY

## 2022-01-10 PROCEDURE — 3017F COLORECTAL CA SCREEN DOC REV: CPT | Performed by: DERMATOLOGY

## 2022-01-10 PROCEDURE — 1123F ACP DISCUSS/DSCN MKR DOCD: CPT | Performed by: DERMATOLOGY

## 2022-01-10 NOTE — PROGRESS NOTES
Patient's Name: Janet Leal  MRN: <[id number]>  YOB: 1950  Date of Visit: 1/10/2022  Primary Care Provider: Raul Sampson MD  Referring Provider: No ref. provider found    Subjective:   Chief Complaint:   Follow-up (Follow  up from use of Efudex treatment. )      History of Present Illness:   Janet Leal is a 66 y.o. male who presents in clinic today for a follow up on efudex treatment of actinic keratosis    Last office visit: 10/4/21    At their last visit they were prescribed efudex to treat Aks on scalp, forehead, nose, temples and cheeks. Patient reports using it for two weeks only. Due to the [facility], he was unable to extend beyond the two weeks. He feels the areas treated responded well, however still feels rough spots on his saclp that he thinks may be related to a new shampoo. Denies any new, changing, bleeding or non healing lesions. Past medical and surgical histories, current medications, allergies, social and family histories were reviewed with no change since the last visit        Allergies:  No Known Allergies    Current Medications:  Current Outpatient Medications   Medication Sig Dispense Refill    levothyroxine (SYNTHROID) 125 MCG tablet TAKE 1 TABLET BY MOUTH EVERY DAY 90 tablet 3    fluorouracil (EFUDEX) 5 % cream Apply twice daily to affected area areas for 2 to 3 weeks 40 g 1    sildenafil (VIAGRA) 100 MG tablet Take 1 tablet by mouth as needed for Erectile Dysfunction 10 tablet 3     No current facility-administered medications for this visit. Review of Systems:  Constitutional: No fevers, chills or recent illness.    Skin: Skin:As per HPI AND otherwise no new, bleeding or symptomatic skin lesions      Objective:     Vitals:    01/10/22 0944   Temp: 97.3 °F (36.3 °C)       Physical Examination:  General: alert, comfortable, no apparent distress, well-appearing  Psych: alert, oriented and pleasant  Neuro: oriented to person, place, and time  Skin: Areas examined: head including face, lips, conjunctiva and lids, neck, hair/scalp, left hand, right hand and digits and nails      All areas examined were within normal limits except those listed below with the appropriate assessment and plan    Assessment and Plan (with relevant objective exam findings):     1. Actinic keratosis treated with topical fluorouracil (5FU)   Location/objective findings: crown of scalp and forehead with gritty erythematous crusted papules   Discussed with patient that the other areas treated responded well, however the scalp continues to have lesions and re-treatment is appropriate. He verbalized understanding and wll initiate treatment with efudex twice daily to scalp x 3 -4 weeks      Follow up:  Return visit in 3 months or as needed for change in condition. All questions addressed.      Procedure:   No procedure performed          Sarah Schaeffer MD. MS

## 2022-02-24 ENCOUNTER — OFFICE VISIT (OUTPATIENT)
Dept: FAMILY MEDICINE CLINIC | Age: 72
End: 2022-02-24
Payer: MEDICARE

## 2022-02-24 VITALS
HEIGHT: 72 IN | HEART RATE: 76 BPM | BODY MASS INDEX: 26.57 KG/M2 | TEMPERATURE: 98.6 F | OXYGEN SATURATION: 96 % | WEIGHT: 196.2 LBS

## 2022-02-24 DIAGNOSIS — N20.0 RENAL LITHIASIS: ICD-10-CM

## 2022-02-24 DIAGNOSIS — N02.0 IDIOPATHIC HEMATURIA WITH MINOR GLOMERULAR ABNORMALITY: ICD-10-CM

## 2022-02-24 PROBLEM — N02.9 IDIOPATHIC HEMATURIA: Status: ACTIVE | Noted: 2022-02-24

## 2022-02-24 PROCEDURE — 99213 OFFICE O/P EST LOW 20 MIN: CPT | Performed by: FAMILY MEDICINE

## 2022-02-24 PROCEDURE — 1123F ACP DISCUSS/DSCN MKR DOCD: CPT | Performed by: FAMILY MEDICINE

## 2022-02-24 PROCEDURE — 3017F COLORECTAL CA SCREEN DOC REV: CPT | Performed by: FAMILY MEDICINE

## 2022-02-24 PROCEDURE — G8484 FLU IMMUNIZE NO ADMIN: HCPCS | Performed by: FAMILY MEDICINE

## 2022-02-24 PROCEDURE — 1036F TOBACCO NON-USER: CPT | Performed by: FAMILY MEDICINE

## 2022-02-24 PROCEDURE — 4040F PNEUMOC VAC/ADMIN/RCVD: CPT | Performed by: FAMILY MEDICINE

## 2022-02-24 PROCEDURE — G8417 CALC BMI ABV UP PARAM F/U: HCPCS | Performed by: FAMILY MEDICINE

## 2022-02-24 PROCEDURE — G8427 DOCREV CUR MEDS BY ELIG CLIN: HCPCS | Performed by: FAMILY MEDICINE

## 2022-02-24 ASSESSMENT — ENCOUNTER SYMPTOMS
ABDOMINAL PAIN: 0
VOMITING: 0
NAUSEA: 0
FACIAL SWELLING: 0

## 2022-02-24 ASSESSMENT — LIFESTYLE VARIABLES: TOBACCO_USE: 0

## 2022-02-24 NOTE — PROGRESS NOTES
Subjective:     Patient Nataliya Leal is a 66 y.o. male. Hematuria  This is a recurrent problem. The current episode started more than 1 year ago. The problem has been waxing and waning since onset. He describes the hematuria as gross hematuria. The pain is moderate. He describes his urine color as light pink. Irritative symptoms do not include frequency, nocturia or urgency. Obstructive symptoms do not include dribbling, incomplete emptying, an intermittent stream, a slower stream, straining or a weak stream. Pertinent negatives include no abdominal pain, chills, dysuria, facial swelling, fever, flank pain, genital pain, hesitancy, inability to urinate, nausea or vomiting. He is sexually active. There is no history of BPH,  trauma, hypertension, kidney stones, prostatitis, recent infection, sickle cell disease, STDs or tobacco use. No Known Allergies    Current Outpatient Medications   Medication Sig Dispense Refill    levothyroxine (SYNTHROID) 125 MCG tablet TAKE 1 TABLET BY MOUTH EVERY DAY 90 tablet 3    sildenafil (VIAGRA) 100 MG tablet Take 1 tablet by mouth as needed for Erectile Dysfunction 10 tablet 3     No current facility-administered medications for this visit.        Past Medical History:   Diagnosis Date    Colorectal polyps 2003    Hx of colonoscopy 10/6/03    Hyperlipidemia 12/21/2010    Hypothyroidism        Past Surgical History:   Procedure Laterality Date    COLONOSCOPY  2000,10/30/2012    neg  q10    [facility] [facility], [address]; 2003       Social History     Socioeconomic History    Marital status:      Spouse name: Not on file    Number of children: 3    Years of education: Not on file    Highest education level: Not on file   Occupational History    Occupation: retired [employer]    Tobacco Use    Smoking status: Never Smoker    Smokeless tobacco: Never Used   Vaping Use    Vaping Use: Never used   Substance and Sexual Activity    Alcohol use: Not Currently    Drug use: No    Sexual activity: Yes     Partners: Female   Other Topics Concern    Not on file   Social History Narrative    Happily  with 3 kids--1 lives with him(bipolar)    Works on [employer]    Exercise; walks    + seatbelts     Social Determinants of Health     Financial Resource Strain:     Difficulty of Paying Living Expenses: Not on file   Food Insecurity:     Worried About [address] in the Last Year: Not on file    [address] in the Last Year: Not on file   Transportation Needs:     Lack of Transportation (Medical): Not on file    Lack of Transportation (Non-Medical):  Not on file   Physical Activity:     Days of Exercise per Week: Not on file    Minutes of Exercise per Session: Not on file   Stress:     Feeling of Stress : Not on file   Social Connections:     Frequency of Communication with Friends and Family: Not on file    Frequency of Social Gatherings with Friends and Family: Not on file    Attends Sabianism Services: Not on file    Active Member of [address] [place] [address] or Organizations: Not on file    Attends Club or Organization Meetings: Not on file    Marital Status: Not on file   Intimate Partner Violence:     Fear of Current or Ex-Partner: Not on file    Emotionally Abused: Not on file    Physically Abused: Not on file    Sexually Abused: Not on file   Housing Stability:     Unable to Pay for Housing in the Last Year: Not on file    Number of [place] in the Last Year: Not on file    Unstable Housing in the Last Year: Not on file       Family History   Problem Relation Age of Onset    Cancer Sister         breast    Heart Disease Sister         congenital heart disease    Cancer Sister 29        breast    Cancer Sister 43        breast    Cancer Brother 67        pancreatic--Whipples    Heart Defect Sister        Immunization History   Administered Date(s) Administered    Maame AGUIRRE, Primary or Immunocompromised, PF, 100mcg/0.5mL 02/03/2021, 03/03/2021    Influenza, High Dose (Fluzone 65 yrs and older) 10/19/2015, 01/12/2018    Influenza, Intradermal, Preservative free 01/14/2013    Pneumococcal Conjugate 13-valent ([id number]) 03/27/2017    Pneumococcal Polysaccharide ([id number]) 03/03/2015    Tdap (Boostrix, Adacel) 12/21/2010    Zoster Recombinant (Shingrix) 11/09/2020, 01/07/2021       Review of Systems  Review of Systems   Constitutional: Negative for chills and fever. HENT: Negative for facial swelling. Gastrointestinal: Negative for abdominal pain, nausea and vomiting. Genitourinary: Positive for hematuria. Negative for dysuria, flank pain, frequency, hesitancy, incomplete emptying, nocturia and urgency. Objective:   Physical Exam  Physical Exam  Vitals reviewed. Constitutional:       General: He is not in acute distress. Appearance: He is well-developed. Eyes:      Conjunctiva/sclera: Conjunctivae normal.      Pupils: Pupils are equal, round, and reactive to light. Neck:      Thyroid: No thyromegaly. Vascular: No JVD. Trachea: No tracheal deviation. Cardiovascular:      Rate and Rhythm: Normal rate and regular rhythm. Heart sounds: Normal heart sounds. No murmur heard. No gallop. Pulmonary:      Effort: Pulmonary effort is normal. No respiratory distress. Breath sounds: Normal breath sounds. No stridor. No wheezing or rales. Chest:      Chest wall: No tenderness. Abdominal:      General: Bowel sounds are normal. There is no distension. Palpations: Abdomen is soft. There is no mass. Tenderness: There is no abdominal tenderness. Musculoskeletal:         General: No tenderness. Lymphadenopathy:      Cervical: No cervical adenopathy. Skin:     General: Skin is warm and dry. Coloration: Skin is not pale. Findings: No erythema or rash.    Neurological:      Mental Status: He is alert and oriented to person, place, and time.      Cranial Nerves: No cranial nerve deficit. Motor: No abnormal muscle tone. Coordination: Coordination normal.      Deep Tendon Reflexes: Reflexes normal.   Psychiatric:         Behavior: Behavior normal.         Thought Content:  Thought content normal.         Judgment: Judgment normal.         Assessment and Plan:     Renal lithiasis   ? issue    Idiopathic hematuria  will get CT

## 2022-03-02 ENCOUNTER — TELEPHONE (OUTPATIENT)
Dept: DERMATOLOGY | Age: 72
End: 2022-03-02

## 2022-03-02 NOTE — TELEPHONE ENCOUNTER
Called patient. Patient did not answer. Left message for patient to return call and to schedule visit. Patient needs to be rescheduled for the week of April 18 in a office visit slot that is open.

## 2022-03-05 ENCOUNTER — HOSPITAL ENCOUNTER (OUTPATIENT)
Dept: CT IMAGING | Age: 72
Discharge: HOME | End: 2022-03-05
Payer: MEDICARE

## 2022-03-05 DIAGNOSIS — N02.0 IDIOPATHIC HEMATURIA WITH MINOR GLOMERULAR ABNORMALITY: ICD-10-CM

## 2022-03-05 PROCEDURE — 74176 CT ABD & PELVIS W/O CONTRAST: CPT

## 2022-03-07 ENCOUNTER — TELEPHONE (OUTPATIENT)
Dept: FAMILY MEDICINE CLINIC | Age: 72
End: 2022-03-07

## 2022-03-07 DIAGNOSIS — N02.0 IDIOPATHIC HEMATURIA WITH MINOR GLOMERULAR ABNORMALITY: ICD-10-CM

## 2022-03-07 DIAGNOSIS — N20.0 RENAL LITHIASIS: Primary | ICD-10-CM

## 2022-03-07 NOTE — TELEPHONE ENCOUNTER
Referral to Dr. Richardson Cameroonian faxed. Patient notified. Given the number of [facility].

## 2022-03-22 ENCOUNTER — OFFICE VISIT (OUTPATIENT)
Dept: FAMILY MEDICINE CLINIC | Age: 72
End: 2022-03-22
Payer: MEDICARE

## 2022-03-22 VITALS
TEMPERATURE: 97.3 F | HEIGHT: 72 IN | HEART RATE: 82 BPM | OXYGEN SATURATION: 98 % | BODY MASS INDEX: 26.79 KG/M2 | WEIGHT: 197.8 LBS

## 2022-03-22 DIAGNOSIS — E78.2 MIXED HYPERLIPIDEMIA: ICD-10-CM

## 2022-03-22 DIAGNOSIS — E03.9 HYPOTHYROIDISM, UNSPECIFIED TYPE: ICD-10-CM

## 2022-03-22 DIAGNOSIS — Z01.818 PRE-OP EXAM: Primary | ICD-10-CM

## 2022-03-22 DIAGNOSIS — N20.0 RENAL LITHIASIS: ICD-10-CM

## 2022-03-22 PROBLEM — R31.0 GROSS HEMATURIA: Status: ACTIVE | Noted: 2022-02-24

## 2022-03-22 PROBLEM — M79.89 RIGHT LEG SWELLING: Status: RESOLVED | Noted: 2021-04-23 | Resolved: 2022-03-22

## 2022-03-22 PROCEDURE — 4040F PNEUMOC VAC/ADMIN/RCVD: CPT | Performed by: FAMILY MEDICINE

## 2022-03-22 PROCEDURE — G8484 FLU IMMUNIZE NO ADMIN: HCPCS | Performed by: FAMILY MEDICINE

## 2022-03-22 PROCEDURE — 93000 ELECTROCARDIOGRAM COMPLETE: CPT | Performed by: FAMILY MEDICINE

## 2022-03-22 PROCEDURE — G8427 DOCREV CUR MEDS BY ELIG CLIN: HCPCS | Performed by: FAMILY MEDICINE

## 2022-03-22 PROCEDURE — 1036F TOBACCO NON-USER: CPT | Performed by: FAMILY MEDICINE

## 2022-03-22 PROCEDURE — 99214 OFFICE O/P EST MOD 30 MIN: CPT | Performed by: FAMILY MEDICINE

## 2022-03-22 PROCEDURE — 1123F ACP DISCUSS/DSCN MKR DOCD: CPT | Performed by: FAMILY MEDICINE

## 2022-03-22 PROCEDURE — 3017F COLORECTAL CA SCREEN DOC REV: CPT | Performed by: FAMILY MEDICINE

## 2022-03-22 PROCEDURE — G8417 CALC BMI ABV UP PARAM F/U: HCPCS | Performed by: FAMILY MEDICINE

## 2022-03-22 NOTE — ASSESSMENT & PLAN NOTE
OKd for planned procedure-had neg abd CT(did have a benign renal cyst and non-obstructing stones)--needs cysto

## 2022-03-22 NOTE — PROGRESS NOTES
Subjective:      Fidencio Hernandez is a 67 y.o. male who presents to the office today for a preoperative consultation at the request of surgeon Dr Cresencio Hollingsworth who plans on performing cysto(gross hematuria) on April 11. This consultation is requested for the specific conditions prompting preoperative evaluation (i.e. because of potential affect on operative risk): none. Planned anesthesia is General.  The patient and family have no known anesthesia issues nor bleeding risks.    Past Medical History:   Diagnosis Date    Colorectal polyps 2003    Hx of colonoscopy 10/6/03    Hyperlipidemia 12/21/2010    Hypothyroidism      Patient Active Problem List    Diagnosis Date Noted    Renal lithiasis 02/24/2022    Gross hematuria 02/24/2022    Right leg swelling 04/23/2021    Pain of right hand 02/17/2020    Mass of soft tissue of hand 02/07/2020    Seborrheic keratoses, inflamed 01/22/2020    History of squamous cell carcinoma of skin 01/22/2020    History of basal cell carcinoma 01/22/2020    Actinic keratoses 01/22/2020    Varicose veins of both legs with edema 08/01/2018    Allergic rhinitis 08/01/2018    Other male erectile dysfunction 08/01/2018    Skin cancer of face 03/27/2017    Pre-op exam 03/27/2017    Aphthae, oral 06/14/2016    Fatigue 03/03/2015    Benign cyst of skin 03/03/2015    Hypothyroid 12/21/2010    Hyperlipidemia 12/21/2010    Colon polyp 12/21/2010    Hx of colonoscopy 10/06/2003     Past Surgical History:   Procedure Laterality Date    COLONOSCOPY  2000,10/30/2012    neg  q10    Amilcar Forno 75    HERNIA REPAIR  1990; 2003     Family History   Problem Relation Age of Onset    Cancer Sister         breast    Heart Disease Sister         congenital heart disease    Cancer Sister 29        breast    Cancer Sister 43        breast    Cancer Brother 67        pancreatic--Whipples    Heart Defect Sister      Social History     Socioeconomic History    Marital status:      Spouse name: Not on file    Number of children: 3    Years of education: Not on file    Highest education level: Not on file   Occupational History    Occupation: retired [employer]    Tobacco Use    Smoking status: Never Smoker    Smokeless tobacco: Never Used   Vaping Use    Vaping Use: Never used   Substance and Sexual Activity    Alcohol use: Not Currently    Drug use: No    Sexual activity: Yes     Partners: Female   Other Topics Concern    Not on file   Social History Narrative    Happily  with 3 kids--1 lives with him(bipolar)    Works on [employer]    Exercise; walks    + seatbelts     Social Determinants of Health     Financial Resource Strain:     Difficulty of Paying Living Expenses: Not on file   Food Insecurity:     Worried About [address] in the Last Year: Not on file    [address] in the Last Year: Not on file   Transportation Needs:     Lack of Transportation (Medical): Not on file    Lack of Transportation (Non-Medical):  Not on file   Physical Activity:     Days of Exercise per Week: Not on file    Minutes of Exercise per Session: Not on file   Stress:     Feeling of Stress : Not on file   Social Connections:     Frequency of Communication with Friends and Family: Not on file    Frequency of Social Gatherings with Friends and Family: Not on file    Attends Roman Catholic Services: Not on file    Active Member of [address] [place] [address] or Organizations: Not on file    Attends Club or Organization Meetings: Not on file    Marital Status: Not on file   Intimate Partner Violence:     Fear of Current or Ex-Partner: Not on file    Emotionally Abused: Not on file    Physically Abused: Not on file    Sexually Abused: Not on file   Housing Stability:     Unable to Pay for Housing in the Last Year: Not on file    Number of [place] in the Last Year: Not on file    Unstable Housing in the Last Year: Not on file     Current Outpatient Medications Medication Sig Dispense Refill    levothyroxine (SYNTHROID) 125 MCG tablet TAKE 1 TABLET BY MOUTH EVERY DAY 90 tablet 3     No current facility-administered medications for this visit. Current Outpatient Medications on File Prior to Visit   Medication Sig Dispense Refill    levothyroxine (SYNTHROID) 125 MCG tablet TAKE 1 TABLET BY MOUTH EVERY DAY 90 tablet 3     No current facility-administered medications on file prior to visit.      No Known Allergies  Review of Systems  Constitutional: negative  Eyes: negative  Ears, nose, mouth, throat, and face: negative  Respiratory: negative  Cardiovascular: negative  Gastrointestinal: negative  Genitourinary:positive for gross hematuria  Integument/breast: negative  Hematologic/lymphatic: negative  Musculoskeletal:negative  Neurological: negative  Behavioral/Psych: negative  Endocrine: negative  Allergic/Immunologic: negative      Objective:      Pulse 82   Temp 97.3 °F (36.3 °C)   Ht 6' (1.829 m)   Wt 197 lb 12.8 oz (89.7 kg)   SpO2 98%   BMI 26.83 kg/m²     General Appearance:  Alert, cooperative, no distress, appears stated age   Head:  Normocephalic, without obvious abnormality, atraumatic   Eyes:  PERRL, conjunctiva/corneas clear, EOM's intact, fundi benign, both eyes   Ears:  Normal TM's and external ear canals, both ears   Nose: Nares normal, septum midline, mucosa normal, no drainage or sinus tenderness   Throat: Lips, mucosa, and tongue normal; teeth and gums normal   Neck: Supple, symmetrical, trachea midline, no adenopathy, thyroid: not enlarged, symmetric, no tenderness/mass/nodules, no carotid bruit or JVD   Back:   Symmetric, no curvature, ROM normal, no CVA tenderness   Lungs:   Clear to auscultation bilaterally, respirations unlabored   Chest Wall:  No tenderness or deformity   Heart:  Regular rate and rhythm, S1, S2 normal, no murmur, rub or gallop   Abdomen:   Soft, non-tender, bowel sounds active all four quadrants,  no masses, no organomegaly Genitalia:  Normal male   Rectal:  Normal tone, normal prostate, no masses or tenderness;  guaiac negative stool   Extremities: Extremities normal, atraumatic, no cyanosis or edema   Pulses: 2+ and symmetric   Skin: Skin color, texture, turgor normal, no rashes or lesions   Lymph nodes: Cervical, supraclavicular, and axillary nodes normal   Neurologic: Normal         Predictors of intubation difficulty:   Morbid obesity? no   Anatomically abnormal facies? no   Prominent incisors? no   Receding mandible? no   Short, thick neck? no   Neck range of motion: normal   Mallampati score: I (soft palate, uvula, fauces, tonsillar pillars visible)   Thyromental distance: < 6cm   Mouth openincm   Dentition: No chipped, loose, or missing teeth. Cardiographics  ECG: normal sinus rhythm, no blocks or conduction defects, no ischemic changes      Lab Review   No visits with results within 2 Month(s) from this visit. Latest known visit with results is:   Orders Only on 2021   Component Date Value    T4 Free 2021 1.4     TSH 2021 2.30          Assessment:        67 y.o. male with planned surgery as above--OKd for planned procedure  Known risk factors for perioperative complications: None    Difficulty with intubation is not anticipated. Cardiac Risk Estimation: low risk    Current medications which may produce withdrawal symptoms if withheld perioperatively: none       Plan:      1. Preoperative workup as follows hemoglobin, hematocrit, electrolytes, creatinine, glucose  2. Change in medication regimen before surgery: none, continue med regimen including morning of surgery, w/sip of water  3. Prophylaxis for cardiac events with perioperative beta-blockers: not indicated  4. Invasive hemodynamic monitoring perioperatively: not indicated  5.  Deep vein thrombosis prophylaxis postoperatively:regimen to be chosen by surgical team

## 2022-04-19 ENCOUNTER — OFFICE VISIT (OUTPATIENT)
Dept: DERMATOLOGY | Age: 72
End: 2022-04-19
Payer: MEDICARE

## 2022-04-19 VITALS — TEMPERATURE: 98.5 F

## 2022-04-19 DIAGNOSIS — L57.0 ACTINIC KERATOSIS TREATED WITH TOPICAL FLUOROURACIL (5FU): Primary | ICD-10-CM

## 2022-04-19 DIAGNOSIS — L81.4 SOLAR LENTIGINOSIS: ICD-10-CM

## 2022-04-19 DIAGNOSIS — L82.1 SEBORRHEIC KERATOSIS: ICD-10-CM

## 2022-04-19 DIAGNOSIS — L73.8 SEBACEOUS HYPERPLASIA: ICD-10-CM

## 2022-04-19 PROCEDURE — G8417 CALC BMI ABV UP PARAM F/U: HCPCS | Performed by: DERMATOLOGY

## 2022-04-19 PROCEDURE — 3017F COLORECTAL CA SCREEN DOC REV: CPT | Performed by: DERMATOLOGY

## 2022-04-19 PROCEDURE — 1036F TOBACCO NON-USER: CPT | Performed by: DERMATOLOGY

## 2022-04-19 PROCEDURE — 4040F PNEUMOC VAC/ADMIN/RCVD: CPT | Performed by: DERMATOLOGY

## 2022-04-19 PROCEDURE — 99213 OFFICE O/P EST LOW 20 MIN: CPT | Performed by: DERMATOLOGY

## 2022-04-19 PROCEDURE — G8427 DOCREV CUR MEDS BY ELIG CLIN: HCPCS | Performed by: DERMATOLOGY

## 2022-04-19 PROCEDURE — 1123F ACP DISCUSS/DSCN MKR DOCD: CPT | Performed by: DERMATOLOGY

## 2022-04-19 NOTE — PROGRESS NOTES
Patient's Name: Nicole Lama  MRN: [id number]  YOB: 1950  Date of Visit: 4/19/2022  Primary Care Provider: Kash Sandoval MD  Referring Provider: No ref. provider found    Subjective:   Chief Complaint:   Follow-up (efudex for AK)      History of Present Illness:   Nicole Lama is a 67 y.o. male with h/o NMSC and Aks who presents in clinic today for a follow up on efudex treatment of AKs on scalp    Last office visit: 1/10/22    At their last visit they were to use efudex on scalp AKs x 3 weeks    Patient reports being able to tolerate efudex for a longer period of time than last treatment in December. He reports area treated healed well. Denies any new, changing, bleeding, non healing or pruritic lesions. Past medical and surgical histories, current medications, allergies, social and family histories were reviewed with no change since the last visit        Allergies:  No Known Allergies    Current Medications:  Current Outpatient Medications   Medication Sig Dispense Refill    levothyroxine (SYNTHROID) 125 MCG tablet TAKE 1 TABLET BY MOUTH EVERY DAY 90 tablet 3     No current facility-administered medications for this visit. Review of Systems:  Constitutional: No fevers, chills or recent illness. Skin: Skin:As per HPI AND otherwise no new, bleeding or symptomatic skin lesions      Objective:     Vitals:    04/19/22 1151   Temp: 98.5 °F (36.9 °C)       Physical Examination:  General: alert, comfortable, no apparent distress, well-appearing  Psych: alert, oriented and pleasant  Neuro: oriented to person, place, and time  Skin: Areas examined: head including face, lips, conjunctiva and lids, neck, hair/scalp, left hand, right hand and digits and nails      All areas examined were within normal limits except those listed below with the appropriate assessment and plan    Assessment and Plan (with relevant objective exam findings):     1.  Actinic keratosis treated with topical fluorouracil (5FU)   Location/objective findings: No lesions on exam today. Discussed the importance of sun protection. 2. Sebaceous hyperplasia. Location: Forehead and cheeks  Objective findings: yellow umbilicated papule(s) with blood vessels overlying them on dermatoscopy. -Reassurance, re: benign nature. No treatment is necessary      3. Seborrheic Keratosis  Location: face  Objective: Waxy, stuck on keratotic brown and tan  papules. -Reassurance provided to the patient regarding their chronic and benign nature. No treatment performed      4. Solar Lentiginosis    Location: sun exposed areas, most prominently on the dorsal hands, face and neck      Objective: Numerous lacy brown macules ranging in size from 2-10 mm diameter. The lentigines seen today are benign in character, caused by the sun, and do not require treatment. However, they do occasionally transform into a malignancy, so the patient needs to monitor for changes. They were educated on what a suspicious change would include, change in size or color, and included education on the ABCDs of melanoma. Follow up:  Patient to establish with a new dermatologist due to my departure from [facility]. All questions addressed.      Procedure:   No procedure performed          Phyllis Gowers, MD. MS

## 2022-04-21 PROBLEM — Z01.818 PRE-OP EXAM: Status: RESOLVED | Noted: 2017-03-27 | Resolved: 2022-04-21

## 2022-04-22 ENCOUNTER — TELEPHONE (OUTPATIENT)
Dept: FAMILY MEDICINE CLINIC | Age: 72
End: 2022-04-22

## 2022-04-22 NOTE — TELEPHONE ENCOUNTER
Pt called and stated that he wants the results of his Cystoscopy. He would like to go over this with Dr Linsey Gonzalez.     LOV: 3/22/22

## 2022-08-10 ENCOUNTER — TELEPHONE (OUTPATIENT)
Dept: FAMILY MEDICINE CLINIC | Age: 72
End: 2022-08-10

## 2022-08-10 NOTE — TELEPHONE ENCOUNTER
Called and attempted to get pt scheduled to be seen. Pt questioned if it was necessary for a virtual visit or if this could wait until Monday. I informed pt that if medication is wanting to be prescribed that a provider would have to see him first. Pt stated that he would call back Monday to schedule an appointment.

## 2022-08-10 NOTE — TELEPHONE ENCOUNTER
Pt called to report that at first his lips were very dry, then they were stinging and burning. His tongue is white and a pharmacist in [place] (where pt is on vacation) recommended and OTC, which helps for 3-4 hours. Pt is wondering if there's something better available. Please call cindy.     LOV 03/22/2022

## 2022-08-10 NOTE — TELEPHONE ENCOUNTER
Please schedule a virtual visit to be evaluated further or advise patient to be seen in the local urgent care.

## 2022-08-15 ENCOUNTER — OFFICE VISIT (OUTPATIENT)
Dept: FAMILY MEDICINE CLINIC | Age: 72
End: 2022-08-15
Payer: MEDICARE

## 2022-08-15 ENCOUNTER — TELEPHONE (OUTPATIENT)
Dept: FAMILY MEDICINE CLINIC | Age: 72
End: 2022-08-15

## 2022-08-15 VITALS
BODY MASS INDEX: 26.28 KG/M2 | OXYGEN SATURATION: 97 % | TEMPERATURE: 97.5 F | DIASTOLIC BLOOD PRESSURE: 80 MMHG | HEIGHT: 72 IN | SYSTOLIC BLOOD PRESSURE: 118 MMHG | WEIGHT: 194 LBS | HEART RATE: 65 BPM

## 2022-08-15 DIAGNOSIS — E03.9 HYPOTHYROIDISM, UNSPECIFIED TYPE: ICD-10-CM

## 2022-08-15 DIAGNOSIS — Q38.3 TONGUE ABNORMALITY: Primary | ICD-10-CM

## 2022-08-15 DIAGNOSIS — J30.89 NON-SEASONAL ALLERGIC RHINITIS, UNSPECIFIED TRIGGER: ICD-10-CM

## 2022-08-15 DIAGNOSIS — E78.2 MIXED HYPERLIPIDEMIA: ICD-10-CM

## 2022-08-15 PROBLEM — M79.641 PAIN OF RIGHT HAND: Status: RESOLVED | Noted: 2020-02-17 | Resolved: 2022-08-15

## 2022-08-15 PROBLEM — R31.0 GROSS HEMATURIA: Status: RESOLVED | Noted: 2022-02-24 | Resolved: 2022-08-15

## 2022-08-15 LAB
A/G RATIO: 2.1 (ref 1.1–2.2)
ALBUMIN SERPL-MCNC: 4.1 G/DL (ref 3.4–5)
ALP BLD-CCNC: 58 U/L (ref 40–129)
ALT SERPL-CCNC: 20 U/L (ref 10–40)
ANION GAP SERPL CALCULATED.3IONS-SCNC: 15 MMOL/L (ref 3–16)
AST SERPL-CCNC: 17 U/L (ref 15–37)
BASOPHILS ABSOLUTE: 0 K/UL (ref 0–0.2)
BASOPHILS RELATIVE PERCENT: 0.8 %
BILIRUB SERPL-MCNC: 0.3 MG/DL (ref 0–1)
BUN BLDV-MCNC: 14 MG/DL (ref 7–20)
CALCIUM SERPL-MCNC: 8.8 MG/DL (ref 8.3–10.6)
CHLORIDE BLD-SCNC: 107 MMOL/L (ref 99–110)
CHOLESTEROL, TOTAL: 243 MG/DL (ref 0–199)
CO2: 24 MMOL/L (ref 21–32)
CREAT SERPL-MCNC: 1 MG/DL (ref 0.8–1.3)
EOSINOPHILS ABSOLUTE: 0.3 K/UL (ref 0–0.6)
EOSINOPHILS RELATIVE PERCENT: 5.8 %
GFR AFRICAN AMERICAN: >60
GFR NON-AFRICAN AMERICAN: >60
GLUCOSE BLD-MCNC: 90 MG/DL (ref 70–99)
HCT VFR BLD CALC: 42.5 % (ref 40.5–52.5)
HDLC SERPL-MCNC: 41 MG/DL (ref 40–60)
HEMOGLOBIN: 14.5 G/DL (ref 13.5–17.5)
LDL CHOLESTEROL CALCULATED: ABNORMAL MG/DL
LDL CHOLESTEROL DIRECT: 159 MG/DL
LYMPHOCYTES ABSOLUTE: 1.2 K/UL (ref 1–5.1)
LYMPHOCYTES RELATIVE PERCENT: 23.9 %
MCH RBC QN AUTO: 32.1 PG (ref 26–34)
MCHC RBC AUTO-ENTMCNC: 34.2 G/DL (ref 31–36)
MCV RBC AUTO: 93.9 FL (ref 80–100)
MONOCYTES ABSOLUTE: 0.5 K/UL (ref 0–1.3)
MONOCYTES RELATIVE PERCENT: 9.7 %
NEUTROPHILS ABSOLUTE: 3 K/UL (ref 1.7–7.7)
NEUTROPHILS RELATIVE PERCENT: 59.8 %
PDW BLD-RTO: 13.6 % (ref 12.4–15.4)
PLATELET # BLD: 206 K/UL (ref 135–450)
PMV BLD AUTO: 8.7 FL (ref 5–10.5)
POTASSIUM SERPL-SCNC: 4.4 MMOL/L (ref 3.5–5.1)
RBC # BLD: 4.53 M/UL (ref 4.2–5.9)
SODIUM BLD-SCNC: 146 MMOL/L (ref 136–145)
TOTAL PROTEIN: 6.1 G/DL (ref 6.4–8.2)
TRIGL SERPL-MCNC: 321 MG/DL (ref 0–150)
TSH REFLEX FT4: 0.39 UIU/ML (ref 0.27–4.2)
VLDLC SERPL CALC-MCNC: ABNORMAL MG/DL
WBC # BLD: 5 K/UL (ref 4–11)

## 2022-08-15 PROCEDURE — G8427 DOCREV CUR MEDS BY ELIG CLIN: HCPCS | Performed by: NURSE PRACTITIONER

## 2022-08-15 PROCEDURE — G8417 CALC BMI ABV UP PARAM F/U: HCPCS | Performed by: NURSE PRACTITIONER

## 2022-08-15 PROCEDURE — 99213 OFFICE O/P EST LOW 20 MIN: CPT | Performed by: NURSE PRACTITIONER

## 2022-08-15 PROCEDURE — 3017F COLORECTAL CA SCREEN DOC REV: CPT | Performed by: NURSE PRACTITIONER

## 2022-08-15 PROCEDURE — 3288F FALL RISK ASSESSMENT DOCD: CPT | Performed by: NURSE PRACTITIONER

## 2022-08-15 PROCEDURE — 1123F ACP DISCUSS/DSCN MKR DOCD: CPT | Performed by: NURSE PRACTITIONER

## 2022-08-15 PROCEDURE — 1036F TOBACCO NON-USER: CPT | Performed by: NURSE PRACTITIONER

## 2022-08-15 RX ORDER — CLOTRIMAZOLE 10 MG/1
10 LOZENGE ORAL; TOPICAL
Qty: 50 TABLET | Refills: 0 | Status: SHIPPED | OUTPATIENT
Start: 2022-08-15 | End: 2022-08-25

## 2022-08-15 SDOH — ECONOMIC STABILITY: FOOD INSECURITY: WITHIN THE PAST 12 MONTHS, THE FOOD YOU BOUGHT JUST DIDN'T LAST AND YOU DIDN'T HAVE MONEY TO GET MORE.: NEVER TRUE

## 2022-08-15 SDOH — ECONOMIC STABILITY: FOOD INSECURITY: WITHIN THE PAST 12 MONTHS, YOU WORRIED THAT YOUR FOOD WOULD RUN OUT BEFORE YOU GOT MONEY TO BUY MORE.: NEVER TRUE

## 2022-08-15 ASSESSMENT — PATIENT HEALTH QUESTIONNAIRE - PHQ9
SUM OF ALL RESPONSES TO PHQ QUESTIONS 1-9: 0
SUM OF ALL RESPONSES TO PHQ QUESTIONS 1-9: 0
SUM OF ALL RESPONSES TO PHQ9 QUESTIONS 1 & 2: 0
1. LITTLE INTEREST OR PLEASURE IN DOING THINGS: 0
SUM OF ALL RESPONSES TO PHQ QUESTIONS 1-9: 0
DEPRESSION UNABLE TO ASSESS: FUNCTIONAL CAPACITY MOTIVATION LIMITS ACCURACY
2. FEELING DOWN, DEPRESSED OR HOPELESS: 0
SUM OF ALL RESPONSES TO PHQ QUESTIONS 1-9: 0

## 2022-08-15 ASSESSMENT — SOCIAL DETERMINANTS OF HEALTH (SDOH): HOW HARD IS IT FOR YOU TO PAY FOR THE VERY BASICS LIKE FOOD, HOUSING, MEDICAL CARE, AND HEATING?: NOT HARD AT ALL

## 2022-08-15 NOTE — ASSESSMENT & PLAN NOTE
Intermittently bothersome. Instructed patient to stop all antihistamine use while treating dry mouth problems.

## 2022-08-15 NOTE — TELEPHONE ENCOUNTER
Message given to patient - Yes he can but not at the same time/ The lozenges need to leave their film.

## 2022-08-15 NOTE — PROGRESS NOTES
Ayala Pratt (:  1950) is a 67 y.o. male,Established patient, here for evaluation of the following chief complaint(s): Other (Dry mouth, has been present for ~4-6 weeks.)      ASSESSMENT/PLAN:  1. Tongue abnormality  Assessment & Plan:  Suspect thrush. We will try 10 days of Mycelex atrocious. Instructed him to return to his mild toothpaste he was using prior to all this started, stop any antihistamine use. Labs to be drawn today. We will follow-up in 10 days. Orders:  -     clotrimazole (MYCELEX) 10 MG vineet; Take 1 tablet by mouth 5 times daily for 10 days, Disp-50 tablet, R-0Normal  2. Mixed hyperlipidemia  -     CBC with Auto Differential; Future  -     Comprehensive Metabolic Panel; Future  -     LIPID PANEL; Future  3. Hypothyroidism, unspecified type  -     TSH with Reflex to FT4; Future  4. Non-seasonal allergic rhinitis, unspecified trigger  Assessment & Plan:  Intermittently bothersome. Instructed patient to stop all antihistamine use while treating dry mouth problems. No follow-ups on file. SUBJECTIVE/OBJECTIVE:      Current Outpatient Medications   Medication Sig Dispense Refill    clotrimazole (MYCELEX) 10 MG vineet Take 1 tablet by mouth 5 times daily for 10 days 50 tablet 0    levothyroxine (SYNTHROID) 125 MCG tablet TAKE 1 TABLET BY MOUTH EVERY DAY 90 tablet 3     No current facility-administered medications for this visit. Review of Systems   HENT:          X5 weeks  Stinging burning tongue  Taste altered  Lips mouth dry      Vitals:    08/15/22 0824   BP: 118/80   Site: Left Upper Arm   Position: Sitting   Cuff Size: Medium Adult   Pulse: 65   Temp: 97.5 °F (36.4 °C)   SpO2: 97%   Weight: 194 lb (88 kg)   Height: 6' (1.829 m)       Physical Exam  Constitutional:       Appearance: Normal appearance. He is well-developed and normal weight. HENT:      Head: Normocephalic.       Right Ear: Tympanic membrane normal.      Left Ear: Tympanic membrane normal. Mouth/Throat:      Mouth: Mucous membranes are moist.      Comments: Tongue beefy red, mild white film noted on posterior tongue. No dryness noted. Eyes:      Pupils: Pupils are equal, round, and reactive to light. Cardiovascular:      Rate and Rhythm: Normal rate and regular rhythm. Heart sounds: Normal heart sounds. Pulmonary:      Effort: Pulmonary effort is normal.      Breath sounds: Normal breath sounds. Musculoskeletal:         General: Normal range of motion. Cervical back: Normal range of motion. Skin:     General: Skin is warm and dry. Neurological:      Mental Status: He is alert and oriented to person, place, and time. An electronic signature was used to authenticate this note.     --ALLAN Hyde - CNP

## 2022-08-23 ENCOUNTER — TELEPHONE (OUTPATIENT)
Dept: FAMILY MEDICINE CLINIC | Age: 72
End: 2022-08-23

## 2022-08-23 DIAGNOSIS — R68.2 DRY MOUTH: Primary | ICD-10-CM

## 2022-08-23 NOTE — TELEPHONE ENCOUNTER
Pt saw Chip Pietro 8/15/22  He was given lozenges for dry mouth  They aren't working  He still has 2 days left of lozenges but seeing no improvement, it's getting worse  He would like the referral to see ENT

## 2022-08-29 ENCOUNTER — OFFICE VISIT (OUTPATIENT)
Dept: ENT CLINIC | Age: 72
End: 2022-08-29
Payer: MEDICARE

## 2022-08-29 VITALS
DIASTOLIC BLOOD PRESSURE: 78 MMHG | HEIGHT: 72 IN | SYSTOLIC BLOOD PRESSURE: 120 MMHG | HEART RATE: 71 BPM | BODY MASS INDEX: 26.28 KG/M2 | WEIGHT: 194 LBS

## 2022-08-29 DIAGNOSIS — K14.6 BURNING MOUTH SYNDROME: ICD-10-CM

## 2022-08-29 DIAGNOSIS — R68.2 DRY MOUTH: ICD-10-CM

## 2022-08-29 DIAGNOSIS — R68.2 DRY MOUTH: Primary | ICD-10-CM

## 2022-08-29 PROCEDURE — 1123F ACP DISCUSS/DSCN MKR DOCD: CPT | Performed by: OTOLARYNGOLOGY

## 2022-08-29 PROCEDURE — 3017F COLORECTAL CA SCREEN DOC REV: CPT | Performed by: OTOLARYNGOLOGY

## 2022-08-29 PROCEDURE — G8427 DOCREV CUR MEDS BY ELIG CLIN: HCPCS | Performed by: OTOLARYNGOLOGY

## 2022-08-29 PROCEDURE — 99203 OFFICE O/P NEW LOW 30 MIN: CPT | Performed by: OTOLARYNGOLOGY

## 2022-08-29 PROCEDURE — 1036F TOBACCO NON-USER: CPT | Performed by: OTOLARYNGOLOGY

## 2022-08-29 PROCEDURE — G8417 CALC BMI ABV UP PARAM F/U: HCPCS | Performed by: OTOLARYNGOLOGY

## 2022-08-29 ASSESSMENT — ENCOUNTER SYMPTOMS
EYE ITCHING: 0
SHORTNESS OF BREATH: 0
RHINORRHEA: 0
SORE THROAT: 0
TROUBLE SWALLOWING: 0
SINUS PRESSURE: 0
VOICE CHANGE: 0
DIARRHEA: 0
FACIAL SWELLING: 0
SINUS PAIN: 0
COUGH: 0
NAUSEA: 0
CHOKING: 0
EYE PAIN: 0
EYE REDNESS: 0

## 2022-08-29 NOTE — PROGRESS NOTES
Subjective:      Patient ID: Denny Nagy is a 67 y.o. male. HPI  Chief Complaint   Patient presents with    Dry mouth       History of Present Illness  Head/Neck    Hue Leyva is a(n) 67 y.o. male who presents with an 8 week history of dry mouth, burning mouth and loss of taste. No recent cold or flu. Had Covid but prior. No recent antibiotics. No new medications.      Patient Active Problem List   Diagnosis    Hypothyroid    Hyperlipidemia    Colon polyp    Hx of colonoscopy    Fatigue    Benign cyst of skin    Aphthae, oral    Skin cancer of face    Varicose veins of both legs with edema    Allergic rhinitis    Other male erectile dysfunction    Seborrheic keratoses, inflamed    History of squamous cell carcinoma of skin    History of basal cell carcinoma    Actinic keratoses    Mass of soft tissue of hand    Renal lithiasis    Tongue abnormality     Past Surgical History:   Procedure Laterality Date    COLONOSCOPY  2000,10/30/2012    neg  q10    CYSTOSCOPY  04/12/2022    Bimal--gross hematuria--large vascular prostate(cause of bleeding)    [place] [facility]; 2003     Family History   Problem Relation Age of Onset    Cancer Sister         breast    Heart Disease Sister         congenital heart disease    Cancer Sister 29        breast    Cancer Sister 43        breast    Cancer Brother 67        pancreatic--Whipples    Heart Defect Sister      Social History     Socioeconomic History    Marital status:      Spouse name: Not on file    Number of children: 3    Years of education: Not on file    Highest education level: Not on file   Occupational History    Occupation: retired [employer]    Tobacco Use    Smoking status: Never    Smokeless tobacco: Never   Vaping Use    Vaping Use: Never used   Substance and Sexual Activity    Alcohol use: Not Currently    Drug use: No    Sexual activity: Yes     Partners: Female   Other Topics Concern    Not on file   Social History Narrative and shortness of breath. Gastrointestinal:  Negative for diarrhea and nausea. Endocrine: Negative for cold intolerance and heat intolerance. Objective:   Physical Exam  Constitutional:       General: He is not in acute distress. Appearance: He is well-developed. HENT:      Head: Not macrocephalic and not microcephalic. No abrasion or contusion. Mouth/Throat:      Lips: No lesions. Mouth: No oral lesions. Dentition: Normal dentition. Tongue: No lesions. Palate: No mass. Pharynx: No oropharyngeal exudate, posterior oropharyngeal erythema or uvula swelling. Tonsils: No tonsillar abscesses. Neck:      Thyroid: No thyroid mass or thyromegaly. Trachea: No tracheal tenderness or tracheal deviation. Cardiovascular:      Rate and Rhythm: Normal rate and regular rhythm. Pulmonary:      Breath sounds: No stridor. Musculoskeletal:      Cervical back: Normal range of motion and neck supple. No edema or erythema. No muscular tenderness. Lymphadenopathy:      Head:      Right side of head: No submental, submandibular, tonsillar, preauricular, posterior auricular or occipital adenopathy. Left side of head: No submental, submandibular, tonsillar, preauricular or occipital adenopathy. Cervical: No cervical adenopathy. Right cervical: No superficial, deep or posterior cervical adenopathy. Left cervical: No superficial, deep or posterior cervical adenopathy. Skin:     General: Skin is warm and dry. Findings: No lesion. Neurological:      Mental Status: He is alert and oriented to person, place, and time. Psychiatric:         Mood and Affect: Mood is not anxious or depressed. Assessment:       Diagnosis Orders   1. Dry mouth  Sjogren's Ab (SS-A, SS-B)      2. Burning mouth syndrome  Sjogren's Ab (SS-A, SS-B)              Plan:      Reviewed recent labwork including CBC  Call with lab results. Described capsaicin therapy. TID.   Follow up in month if not improved.          Tano Limon MD

## 2022-08-29 NOTE — LETTER
[address] [facility]  [address] [place] [address]  Phone: [phone]  Fax: [phone] Kaela Mccormick MD    August 29, 2022     Hilda Montano MD  [address] [facility]    Patient: Ayala Pratt   MR Number: [id number]   YOB: 1950   Date of Visit: 8/29/2022       Dear Hilda Bolds:    Thank you for referring Fabiola Finn to me for evaluation/treatment. Below are the relevant portions of my assessment and plan of care. Diagnosis Orders   1. Dry mouth  Sjogren's Ab (SS-A, SS-B)      2. Burning mouth syndrome  Sjogren's Ab (SS-A, SS-B)            Reviewed recent labwork including CBC  Call with lab results. Described capsaicin therapy. TID. Follow up in month if not improved. If you have questions, please do not hesitate to call me. I look forward to following Rock Mendes along with you.     Sincerely,      Jaime Mccallum MD

## 2022-08-30 LAB
ANTI-SS-A IGG: <0.2 AI (ref 0–0.9)
ANTI-SS-B IGG: <0.2 AI (ref 0–0.9)

## 2022-08-31 ENCOUNTER — TELEPHONE (OUTPATIENT)
Dept: ENT CLINIC | Age: 72
End: 2022-08-31

## 2022-08-31 NOTE — TELEPHONE ENCOUNTER
----- Message from Blas Sharp MD sent at 8/30/2022 12:58 PM EDT -----  Please let patient know that has blood work testing for Sjogren's disease was negative. No evidence of Sjogren's causing his dry mouth.

## 2022-09-07 ENCOUNTER — TELEPHONE (OUTPATIENT)
Dept: FAMILY MEDICINE CLINIC | Age: 72
End: 2022-09-07

## 2022-09-07 DIAGNOSIS — Z12.83 SKIN CANCER SCREENING: Primary | ICD-10-CM

## 2022-09-07 NOTE — TELEPHONE ENCOUNTER
Pt called to ask for a referral to Dr. Adele Portillo, who is his wife's dermatologist.    Dr. Ad Garzon is not accepting new patients, but is willing to accept this pt because of pt's wife. Please call pt when order is in place.     LOV 08/15/2022

## 2022-09-19 ENCOUNTER — HOSPITAL ENCOUNTER (OUTPATIENT)
Dept: GENERAL RADIOLOGY | Age: 72
Discharge: HOME | End: 2022-09-19
Payer: MEDICARE

## 2022-09-19 ENCOUNTER — OFFICE VISIT (OUTPATIENT)
Dept: FAMILY MEDICINE CLINIC | Age: 72
End: 2022-09-19
Payer: MEDICARE

## 2022-09-19 VITALS
OXYGEN SATURATION: 97 % | HEART RATE: 90 BPM | HEIGHT: 72 IN | WEIGHT: 194.8 LBS | SYSTOLIC BLOOD PRESSURE: 120 MMHG | BODY MASS INDEX: 26.38 KG/M2 | DIASTOLIC BLOOD PRESSURE: 80 MMHG

## 2022-09-19 DIAGNOSIS — M79.672 LEFT FOOT PAIN: ICD-10-CM

## 2022-09-19 DIAGNOSIS — M79.672 LEFT FOOT PAIN: Primary | ICD-10-CM

## 2022-09-19 PROCEDURE — 3288F FALL RISK ASSESSMENT DOCD: CPT | Performed by: FAMILY MEDICINE

## 2022-09-19 PROCEDURE — 1123F ACP DISCUSS/DSCN MKR DOCD: CPT | Performed by: FAMILY MEDICINE

## 2022-09-19 PROCEDURE — 73630 X-RAY EXAM OF FOOT: CPT

## 2022-09-19 PROCEDURE — 99213 OFFICE O/P EST LOW 20 MIN: CPT | Performed by: FAMILY MEDICINE

## 2022-09-19 ASSESSMENT — PATIENT HEALTH QUESTIONNAIRE - PHQ9
SUM OF ALL RESPONSES TO PHQ QUESTIONS 1-9: 0
SUM OF ALL RESPONSES TO PHQ9 QUESTIONS 1 & 2: 0
SUM OF ALL RESPONSES TO PHQ QUESTIONS 1-9: 0
2. FEELING DOWN, DEPRESSED OR HOPELESS: 0
1. LITTLE INTEREST OR PLEASURE IN DOING THINGS: 0

## 2022-09-19 NOTE — PROGRESS NOTES
Subjective    Yannick Singh is a 67 y.o. Male who came into the clinic today with concerns of more frequent pain in the left foot. The patient informs me that he has been noticing discomfort and pain in the left foot especially around the big toe joint. As per the patient it has been going on for months and has only become more frequent so is here to get himself   checked. The patient would like to have an x-ray of the foot to investigate further and would like a referral to the   podiatrist for further management. The patient denies any trauma to the area. The patient does have history of   calcaneal spur and fracture in the right foot.   No other questions or concerns    Past Medical History:   Diagnosis Date    Colorectal polyps 2003    Hx of colonoscopy 10/6/03    Hyperlipidemia 12/21/2010    Hypothyroidism        Patient Active Problem List   Diagnosis    Hypothyroid    Hyperlipidemia    Colon polyp    Hx of colonoscopy    Fatigue    Benign cyst of skin    Aphthae, oral    Skin cancer of face    Varicose veins of both legs with edema    Allergic rhinitis    Other male erectile dysfunction    Seborrheic keratoses, inflamed    History of squamous cell carcinoma of skin    History of basal cell carcinoma    Actinic keratoses    Mass of soft tissue of hand    Renal lithiasis    Tongue abnormality       Past Surgical History:   Procedure Laterality Date    COLONOSCOPY  2000,10/30/2012    neg  q10    CYSTOSCOPY  04/12/2022    Bimal--gross hematuria--large vascular prostate(cause of bleeding)    [facility]; 2003       Family History   Problem Relation Age of Onset    Cancer Sister         breast    Heart Disease Sister         congenital heart disease    Cancer Sister 29        breast    Cancer Sister 43        breast    Cancer Brother 67        pancreatic--Whipples    Heart Defect Sister        Social History     Tobacco Use    Smoking status: Never    Smokeless extremity edema. Neurovascular integrity maintained. Mild tenderness on   palpation noted in the left first metatarsophalangeal joint. Arthritis changes noted. NEUROLOGICAL:  The patient was alert, conscious, and cooperative. Oriented   to time, place, and person. Muscle power in all 4 limbs is 5/5. Cranial   nerves II thru XII are grossly intact. No sensory or motor loss. Assessment/Plan     Diagnoses and all orders for this visit:    Left foot pain  -     AFL - Ben Dill DPM, Podiatry, [place]  -     XR FOOT LEFT (MIN 3 VIEWS); Future      Advise given:  - Get appropriate investigations done. Will call back with the results and will manage accordingly. - Take all prescription medication as prescribed. - Drink plenty of fluids.   - Eat five servings of fruits and vegetables everyday. - The importance of proper foot care and regularly checking feet to prevent sores and possibly loss of limbs was reviewed. - The importance of keeping the blood pressure monitoring to prevent stroke, heart attacks, kidney failure, blindness, and loss of limbs was reviewed. - Appropriate handout were given to the patient. -  All the questions and concerns were appropriately answered. - Patient / family member / caregiver verbalized understanding of patient instructions from today's visit. - The patient was advised to follow up with me in 6 months for recheck or can call me before if has any other questions or concerns.

## 2023-02-23 RX ORDER — LEVOTHYROXINE SODIUM 0.12 MG/1
TABLET ORAL
Qty: 90 TABLET | Refills: 3 | Status: SHIPPED | OUTPATIENT
Start: 2023-02-23

## 2023-04-03 ENCOUNTER — OFFICE VISIT (OUTPATIENT)
Dept: DERMATOLOGY | Age: 73
End: 2023-04-03
Payer: MEDICARE

## 2023-04-03 DIAGNOSIS — D48.5 NEOPLASM OF UNCERTAIN BEHAVIOR OF SKIN: Primary | ICD-10-CM

## 2023-04-03 DIAGNOSIS — L57.0 ACTINIC KERATOSIS: ICD-10-CM

## 2023-04-03 PROCEDURE — G8427 DOCREV CUR MEDS BY ELIG CLIN: HCPCS | Performed by: DERMATOLOGY

## 2023-04-03 PROCEDURE — 99213 OFFICE O/P EST LOW 20 MIN: CPT | Performed by: DERMATOLOGY

## 2023-04-03 PROCEDURE — 1036F TOBACCO NON-USER: CPT | Performed by: DERMATOLOGY

## 2023-04-03 PROCEDURE — 3017F COLORECTAL CA SCREEN DOC REV: CPT | Performed by: DERMATOLOGY

## 2023-04-03 PROCEDURE — 11102 TANGNTL BX SKIN SINGLE LES: CPT | Performed by: DERMATOLOGY

## 2023-04-03 PROCEDURE — G8417 CALC BMI ABV UP PARAM F/U: HCPCS | Performed by: DERMATOLOGY

## 2023-04-03 PROCEDURE — 1123F ACP DISCUSS/DSCN MKR DOCD: CPT | Performed by: DERMATOLOGY

## 2023-04-03 PROCEDURE — 11103 TANGNTL BX SKIN EA SEP/ADDL: CPT | Performed by: DERMATOLOGY

## 2023-04-03 RX ORDER — CLOTRIMAZOLE AND BETAMETHASONE DIPROPIONATE 10; .64 MG/G; MG/G
CREAM TOPICAL
COMMUNITY
Start: 2023-02-22

## 2023-04-03 RX ORDER — DEXAMETHASONE 0.5 MG/5ML
SOLUTION ORAL
COMMUNITY
Start: 2023-04-02

## 2023-04-03 RX ORDER — FLUOROURACIL 50 MG/G
CREAM TOPICAL
Qty: 40 G | Refills: 0 | Status: SHIPPED | OUTPATIENT
Start: 2023-04-03

## 2023-04-03 NOTE — PATIENT INSTRUCTIONS

## 2023-04-03 NOTE — PROGRESS NOTES
[facility]  Meir Elizondo MD  [phone]      [address] [facility]  1950    75 y.o. male     Date of Visit: 4/3/2023    Chief Complaint: skin lesions    History of Present Illness:    1. Unknown duration of a couple of persistent lesions on the right side of the nose and also on the dorsum of the left hand. 2.  He has a history of AK's, mainly of the scalp. Treated with photodynamic therapy and Efudex in the past.  Has few new lesions on the ears today. Superficial BCC on the left lower back - excised by Dr. Grabiel Flores in August of 2018. Review of Systems:  Gen: Feels well, good sense of health. Skin: No new or changing moles. Past Medical History, Family History, Surgical History, Medications and Allergies reviewed. Past Medical History:   Diagnosis Date    Colorectal polyps 2003    Hx of colonoscopy 10/6/03    Hyperlipidemia 12/21/2010    Hypothyroidism      Past Surgical History:   Procedure Laterality Date    COLONOSCOPY  2000,10/30/2012    neg  q10    CYSTOSCOPY  04/12/2022    Bimal--gross hematuria--large vascular prostate(cause of bleeding)    [place] [facility]; 2003       No Known Allergies  Outpatient Medications Marked as Taking for the 4/3/23 encounter (Office Visit) with Myra Cordero MD   Medication Sig Dispense Refill    clotrimazole-betamethasone (LOTRISONE) 1-0.05 % cream       dexamethasone 0.5 MG/5ML solution       fluorouracil (EFUDEX) 5 % cream Apply topically 2 times daily to the edges of the ears for 14 days. 40 g 0    levothyroxine (SYNTHROID) 125 MCG tablet TAKE 1 TABLET BY MOUTH EVERY DAY 90 tablet 3     Physical Examination       The following were examined and determined to be normal: Psych/Neuro, Conjunctivae/eyelids, Gums/teeth/lips, Neck, Breast/axilla/chest, Abdomen, Back, RUE, RLE, LLE, and Nails/digits. The following were examined and determined to be abnormal: Scalp/hair, Head/face, and LUE.

## 2023-04-06 DIAGNOSIS — C44.311 BASAL CELL CARCINOMA (BCC) OF RIGHT SIDE OF NOSE: Primary | ICD-10-CM

## 2023-04-06 LAB — DERMATOLOGY PATHOLOGY REPORT: ABNORMAL

## 2023-05-08 ENCOUNTER — PROCEDURE VISIT (OUTPATIENT)
Dept: SURGERY | Age: 73
End: 2023-05-08
Payer: MEDICARE

## 2023-05-08 VITALS — SYSTOLIC BLOOD PRESSURE: 163 MMHG | HEART RATE: 56 BPM | DIASTOLIC BLOOD PRESSURE: 97 MMHG

## 2023-05-08 DIAGNOSIS — C44.311 BASAL CELL CARCINOMA OF RIGHT SIDE OF NOSE: Primary | ICD-10-CM

## 2023-05-08 PROCEDURE — 3017F COLORECTAL CA SCREEN DOC REV: CPT | Performed by: DERMATOLOGY

## 2023-05-08 PROCEDURE — 1123F ACP DISCUSS/DSCN MKR DOCD: CPT | Performed by: DERMATOLOGY

## 2023-05-08 PROCEDURE — 99214 OFFICE O/P EST MOD 30 MIN: CPT | Performed by: DERMATOLOGY

## 2023-05-08 PROCEDURE — 1036F TOBACCO NON-USER: CPT | Performed by: DERMATOLOGY

## 2023-05-08 PROCEDURE — G8427 DOCREV CUR MEDS BY ELIG CLIN: HCPCS | Performed by: DERMATOLOGY

## 2023-05-08 PROCEDURE — G8417 CALC BMI ABV UP PARAM F/U: HCPCS | Performed by: DERMATOLOGY

## 2023-05-08 NOTE — PROGRESS NOTES
S:  location of Lesion: right nasal ala  Symptoms:  none  Date of biopsy: 4/3/23  Previous known tx: no    O: 5 x 5 mm lesion on right nasal alar crease    AP:  NUMBER AND COMPLEXITY OF PROBLEMS ADDRESSED  Acute illness or injury: yes - nodular bcc right ala   Chronic illness:  no  Treatment: Decision for Mohs surgery with local flap or graft reconstruction       Discussed alternative treatments with patient including risks and benefits, which include radiation treatment, excision, curettage, oral therapeutic agents such as erevidge. Indications for Mohs surgery were reviewed and include location. The Mohs AUC score is calculated to be: 7 which indicates that Mohs surgery is appropriate and indicated for this patients lesion. Based on this and the benefits of Mohs surgery over the above options in this patient's case, I recommend Mohs surgery as the optimal treatment. 2. DATA ANALYSIS:   Tests ordered and reviewed:  Pathology report reviewed: yes - nodular bcc  Slides ordered for consult: no  External notes reviewed: yes - Dr. Jin Garcia note reviewed from day of bx. Photo reviewed: yes - clinician photo reviewed from day of bx  Imaging/Labs ordered or reviewed (I.e. INR, CBC, CT, MRI, PET): no  Independent historian:  no     Independent interpretation of tests (personally looked at pathologic slides):   no    Discussion of management with another health care provider: no     3. RISKS OF COMPLICATIONS AND/OR MORBIDITY OR MORTALITY OF PT MANAGEMENT  Decision regarding elective major surgery: The details of Mohs surgery and repair options were discussed with the patient and all questions were answered. The patient opted to proceed with scheduling Mohs surgery as soon as possible. The patient understands to call with any changes to his/her medical condition prior to surgery, including any rapid growth of the lesion discussed today or any change in medications.      Repair options discussed in detail and

## 2023-10-05 ENCOUNTER — OFFICE VISIT (OUTPATIENT)
Dept: DERMATOLOGY | Age: 73
End: 2023-10-05
Payer: MEDICARE

## 2023-10-05 DIAGNOSIS — L57.0 ACTINIC KERATOSIS: Primary | ICD-10-CM

## 2023-10-05 DIAGNOSIS — C44.311 BASAL CELL CARCINOMA (BCC) OF RIGHT SIDE OF NOSE: ICD-10-CM

## 2023-10-05 DIAGNOSIS — D48.5 NEOPLASM OF UNCERTAIN BEHAVIOR OF SKIN: ICD-10-CM

## 2023-10-05 DIAGNOSIS — C44.519 BCC (BASAL CELL CARCINOMA), BACK: ICD-10-CM

## 2023-10-05 PROCEDURE — 1123F ACP DISCUSS/DSCN MKR DOCD: CPT | Performed by: DERMATOLOGY

## 2023-10-05 PROCEDURE — 11103 TANGNTL BX SKIN EA SEP/ADDL: CPT | Performed by: DERMATOLOGY

## 2023-10-05 PROCEDURE — G8427 DOCREV CUR MEDS BY ELIG CLIN: HCPCS | Performed by: DERMATOLOGY

## 2023-10-05 PROCEDURE — G8421 BMI NOT CALCULATED: HCPCS | Performed by: DERMATOLOGY

## 2023-10-05 PROCEDURE — 17261 DSTRJ MAL LES T/A/L .6-1.0CM: CPT | Performed by: DERMATOLOGY

## 2023-10-05 PROCEDURE — 3017F COLORECTAL CA SCREEN DOC REV: CPT | Performed by: DERMATOLOGY

## 2023-10-05 PROCEDURE — 11102 TANGNTL BX SKIN SINGLE LES: CPT | Performed by: DERMATOLOGY

## 2023-10-05 PROCEDURE — 99213 OFFICE O/P EST LOW 20 MIN: CPT | Performed by: DERMATOLOGY

## 2023-10-05 PROCEDURE — 1036F TOBACCO NON-USER: CPT | Performed by: DERMATOLOGY

## 2023-10-05 PROCEDURE — G8484 FLU IMMUNIZE NO ADMIN: HCPCS | Performed by: DERMATOLOGY

## 2023-10-05 NOTE — PROGRESS NOTES
[facility]  Paulino Burrell MD  [phone]      Isidro Crowell Heidel  1950    75 y.o. male     Date of Visit: 10/5/2023    Chief Complaint: skin lesions    History of Present Illness:    1. He has a history of actinic keratoses, mainly of the scalp, treated with photodynamic therapy and Efudex in the past.  At last visit he was prescribed Efudex cream twice daily to apply to the helices of the ears for 14 days. 2.  Unknown duration of asymptomatic lesions on the right brow, right nasal root and right distal nasal dorsum. 3.  He has a recent history of a nodular BCC on the right nasal alar crease, diagnosed by biopsy on 4/3/2023, attended Mohs visit but declined treatment (he reports declining treatment due to an upcoming wedding and also thought it was going to be a consultation rather than surgery that day). Derm hx:  Superficial BCC on the dorsal surface of the left hand-treated with curettage on 4/14/2023. Superficial BCC on the left lower back - excised by Dr. Daniel Li in August of 2018. Review of Systems:  Gen: Feels well, good sense of health. Past Medical History, Family History, Surgical History, Medications and Allergies reviewed.     Past Medical History:   Diagnosis Date    Colorectal polyps 2003    Hx of colonoscopy 10/6/03    Hyperlipidemia 12/21/2010    Hypothyroidism      Past Surgical History:   Procedure Laterality Date    COLONOSCOPY  2000,10/30/2012    neg  q10    CYSTOSCOPY  04/12/2022    Bimal--gross hematuria--large vascular prostate(cause of bleeding)    [facility]; 2003    MOHS SURGERY Right 05/08/2023    nasal alar crease       No Known Allergies  Outpatient Medications Marked as Taking for the 10/5/23 encounter (Office Visit) with Alan Savage MD   Medication Sig Dispense Refill    levothyroxine (SYNTHROID) 125 MCG tablet TAKE 1 TABLET BY MOUTH EVERY DAY 90 tablet 3           Physical Examination       The

## 2023-10-05 NOTE — PATIENT INSTRUCTIONS
Biopsy Wound Care Instructions    Keep the bandage in place for 24 hours. Cleanse the wound with mild soapy water daily  Gently dry the area. Apply Vaseline or petroleum jelly to the wound using a cotton tipped applicator. Cover with a clean bandage. Repeat this process until the biopsy site is healed. If you had stitches placed, continue treating the site until the stitches are removed. Remember to make an appointment to return to have your stitches removed by our staff. You may shower and bathe as usual.       ** Biopsy results generally take around 7 business days to come back. If you have not heard from us by then, please call the office at [phone]. *Please note that biopsy results are released to both the patient and physician at the same time in [address] [place] [address]. Please allow time for your physician to review the results. One of our staff members will reach out to you with the results and plan.

## 2023-10-10 DIAGNOSIS — C44.311 BASAL CELL CARCINOMA (BCC) OF DORSUM OF NOSE: ICD-10-CM

## 2023-10-10 DIAGNOSIS — C44.311 BASAL CELL CARCINOMA (BCC) OF ROOT OF NOSE: ICD-10-CM

## 2023-10-10 DIAGNOSIS — C44.319 BASAL CELL CARCINOMA (BCC) OF BROW: Primary | ICD-10-CM

## 2023-10-10 LAB — DERMATOLOGY PATHOLOGY REPORT: ABNORMAL

## 2023-11-27 ENCOUNTER — HOSPITAL ENCOUNTER (OUTPATIENT)
Dept: GENERAL RADIOLOGY | Age: 73
Discharge: HOME | End: 2023-11-27
Payer: MEDICARE

## 2023-11-27 ENCOUNTER — TELEPHONE (OUTPATIENT)
Dept: FAMILY MEDICINE CLINIC | Age: 73
End: 2023-11-27

## 2023-11-27 DIAGNOSIS — M79.641 HAND PAIN, NOT ARTHRALGIA, RIGHT: Primary | ICD-10-CM

## 2023-11-27 DIAGNOSIS — M79.641 HAND PAIN, NOT ARTHRALGIA, RIGHT: ICD-10-CM

## 2023-11-27 PROCEDURE — 73130 X-RAY EXAM OF HAND: CPT

## 2023-11-27 NOTE — TELEPHONE ENCOUNTER
Pt injured right hand three weeks ago, it is swollen and painful with weakness in a couple of fingers. Could pt get a x-ray ordered or does he need an appt?

## 2023-12-04 ENCOUNTER — TELEPHONE (OUTPATIENT)
Dept: FAMILY MEDICINE CLINIC | Age: 73
End: 2023-12-04

## 2023-12-04 NOTE — TELEPHONE ENCOUNTER
I gave patient the information to call Dr. Cirilo Amaro or his son and he will give us a call back if he needs a referral put in.

## 2023-12-04 NOTE — TELEPHONE ENCOUNTER
Pt called stating his right hand was injured and he got an x ray done and he is still in pain and its still swollen. He stated he was not prescribed any medication for the pain. He wanted to know if he can get a referral to someone who can look at his closer. He just wanted to know the next steps.     [phone] (home)

## 2023-12-05 ENCOUNTER — OFFICE VISIT (OUTPATIENT)
Dept: ORTHOPEDIC SURGERY | Age: 73
End: 2023-12-05
Payer: MEDICARE

## 2023-12-05 VITALS — RESPIRATION RATE: 15 BRPM | BODY MASS INDEX: 26.28 KG/M2 | HEIGHT: 72 IN | WEIGHT: 194 LBS

## 2023-12-05 DIAGNOSIS — S60.221A CONTUSION OF RIGHT HAND, INITIAL ENCOUNTER: Primary | ICD-10-CM

## 2023-12-05 DIAGNOSIS — M19.041 OSTEOARTHRITIS, HAND, PRIMARY LOCALIZED, RIGHT: ICD-10-CM

## 2023-12-05 PROCEDURE — 1123F ACP DISCUSS/DSCN MKR DOCD: CPT | Performed by: ORTHOPAEDIC SURGERY

## 2023-12-05 PROCEDURE — G8427 DOCREV CUR MEDS BY ELIG CLIN: HCPCS | Performed by: ORTHOPAEDIC SURGERY

## 2023-12-05 PROCEDURE — 99204 OFFICE O/P NEW MOD 45 MIN: CPT | Performed by: ORTHOPAEDIC SURGERY

## 2023-12-05 PROCEDURE — 1036F TOBACCO NON-USER: CPT | Performed by: ORTHOPAEDIC SURGERY

## 2023-12-05 PROCEDURE — 3017F COLORECTAL CA SCREEN DOC REV: CPT | Performed by: ORTHOPAEDIC SURGERY

## 2023-12-05 PROCEDURE — G8417 CALC BMI ABV UP PARAM F/U: HCPCS | Performed by: ORTHOPAEDIC SURGERY

## 2023-12-05 PROCEDURE — G8484 FLU IMMUNIZE NO ADMIN: HCPCS | Performed by: ORTHOPAEDIC SURGERY

## 2023-12-05 NOTE — PROGRESS NOTES
This 75 y.o.  right hand dominant retired man  is seen in referral for Marcos Cruz MD  with a chief complaint of injury to their right hand, which was injured 1 month ago when, while using a power drill, the bit locked up, torquing the drill handle which struck the dorsum of his  hand. He noticed pain, swelling, and bruising. He did not consider the injury to be severe and did not seek immediate medical attention. He rested the hand and used an elastic wrap. Because of persisting symptoms Xrays were done three weeks post injury, read as showing degenerative joint changes only and he was referred to us for evaluation. Symptoms have significantly improved since the date of injury. The pain assessment has been reviewed and is correct. The patient's social history, past medical history, family history, medications, allergies and review of systems, entered 12/5/23,  have been reviewed, and dated and are recorded in the chart. On physical examination the patient is Height: 182.9 cm (6') tall and weighs Weight - Scale: 88 kg (194 lb). Respirations are 18 per minute. The patient is well nourished, is oriented to time and place, demonstrates appropriate mood and affect as well as normal gait and station. There is mild soft tissue swelling present about the right hand dorsally. There is no discoloration. There is no deformity. Tenderness is not present on palpation in the area of the right wrist, hand  Range of motion of the right hand and wrist is mildly limited only on the right and is not accompanied by significant pain. Skin is intact, as is distal circulation and sensation. Gross muscle strength is minimally limited only on the right   Hand and wrist joints are stable. There are no subcutaneous nodules or enlarged epitrochlear lymph nodes.     I have personally reviewed and interpreted all previous external imaging studies(Xrays), laboratory tests(LDL, Lipid panel, TSH+Reflex, CMP,

## 2023-12-26 ENCOUNTER — NURSE ONLY (OUTPATIENT)
Dept: SURGERY | Age: 73
End: 2023-12-26

## 2023-12-26 DIAGNOSIS — Z48.02 VISIT FOR SUTURE REMOVAL: Primary | ICD-10-CM

## 2023-12-26 NOTE — PROGRESS NOTES
S:  The patient is here for suture removal s/p Mohs surgery x2 on the (A) right nasal alar crease and (B) right distal nasal dorsum and Intermediate layered closure repair x2, 1 week(s) ago. The site appears well-healed without signs of infection (redness, pain or discharge). The sutures were removed. Wound care and activity instructions given. The patient was scheduled for follow-up PRN for scar/wound check. Patient scheduled on 1/4/24 for Mohs surgery x2. The patient was scheduled for f/u with General Dermatology per their instructions.

## 2023-12-27 ENCOUNTER — TELEPHONE (OUTPATIENT)
Dept: ORTHOPEDIC SURGERY | Age: 73
End: 2023-12-27

## 2023-12-27 NOTE — TELEPHONE ENCOUNTER
Appointment Request     Patient requesting earlier appointment: No  Appointment offered to patient: PATIENT WAS Ana Katz 12/5/23. STATES THAT HIS PROVIDER SPOKE WITH SUKH AND ADVISED HIM TO SCHEDULE APPOINTMENT FOR SURGERY OPTIONS. NO MESSAGE IN CHART TO CONFIRM THIS.  [facility] CALL TO ADVISE   Patient Contact Number: [phone]

## 2024-01-04 ENCOUNTER — PROCEDURE VISIT (OUTPATIENT)
Dept: SURGERY | Age: 74
End: 2024-01-04
Payer: MEDICARE

## 2024-01-04 VITALS — SYSTOLIC BLOOD PRESSURE: 130 MMHG | HEART RATE: 77 BPM | DIASTOLIC BLOOD PRESSURE: 80 MMHG

## 2024-01-04 DIAGNOSIS — C44.311 BASAL CELL CARCINOMA (BCC) OF RIGHT SIDE OF NOSE: ICD-10-CM

## 2024-01-04 DIAGNOSIS — C44.319 BASAL CELL CARCINOMA (BCC) OF EYEBROW: Primary | ICD-10-CM

## 2024-01-04 DIAGNOSIS — Z51.89 VISIT FOR WOUND CHECK: ICD-10-CM

## 2024-01-04 PROCEDURE — 12053 INTMD RPR FACE/MM 5.1-7.5 CM: CPT | Performed by: DERMATOLOGY

## 2024-01-04 PROCEDURE — 17311 MOHS 1 STAGE H/N/HF/G: CPT | Performed by: DERMATOLOGY

## 2024-01-04 NOTE — PATIENT INSTRUCTIONS
helpful to gently massage the area with a moisturizer, petroleum jelly (Vaseline) or Aquaphor. This helps to soften the scar tissue. You can begin this 1 month after the day of your surgery.  -A Silicone scar gel product may also be beneficial in regards to scar appearance. This can be used but is not usually necessary.  -The color of the scar will even out over time, but may remain pink for several months.     Call us at [phone] right away if you have any of the following symptoms:  -Bleeding that you cannot stop (see highlighted area above)   -Pain that lasts longer than 48 hours  -Your wound becomes more painful, red or hot to touch  -Bruising and swelling that does not begin to improve within the 48 hours or gets worse suddenly.      Right Bridge of Nose:  POST-OPERATIVE CARE FOR DISSOLVING STITCHES  Bandage change after 48 hours    During your procedure today, dissolving sutures, or stitches, were used to close the wound. You do not have to have stitches removed. They will dissolve (melt away) on their own. Please follow these instructions to help you recover from your procedure and help your wound heal.    CARING FOR YOUR SURGICAL SITE  The bandage should remain on and completely dry for 48 hours. Do NOT get the bandage wet.  After the first 48 hours, gently remove the remaining part of the bandage. It can be helpful to moisten the bandage edges in the shower. Steri strips may still be on the wound. It is ok, they will fall off slowly with the daily bandage changes.  Gently clean on and around the wound daily with mild soap and water. Some water is okay, but remember the more water on them, the quicker they dissolve!  Dry (pat) the area with a clean Q-tip or gauze. Try to clean off any debris or crust from the area.  Apply a layer of Vaseline/ Aquaphor (or Bacitracin if your doctor recommends) to the wound area only.  Cut a piece of Telfa (or any non-stick dressing) to fit just over the wound and secure

## 2024-01-04 NOTE — PROGRESS NOTES
MOHS PROCEDURE NOTE    PHYSICIAN:  Myra Presley MD, Who operated in two distinct and integrated capacities as the surgeon removing the tissue and as the pathologist examining the tissue.    ASSISTANT: Dick Rey RN     REFERRING PROVIDER:   Gomez Patrick MD    PREOPERATIVE DIAGNOSIS: Nodular Basal Cell Carcinoma     SPECIFIC MOHS INDICATIONS:  location and need for tissue conservation    AUC SCORIN/9    POSTOPERATIVE DIAGNOSIS: SAME    LOCATION: Right Nasal Root (B)    OPERATIVE PROCEDURE:  MOHS MICROGRAPHIC SURGERY    RECONSTRUCTION OF DEFECT: Intermediate layered closure    PREOPERATIVE SIZE: 5 x 5 MM    DEFECT SIZE: 8 x 5 MM    LENGTH OF REPAIRED WOUND/SIZE OF FLAP/SIZE OF GRAFT:  20 MM    ANESTHESIA:  1.5 mL 1% lidocaine with epinephrine 1:100,000 buffered.     EBL:  MINIMAL    DURATION OF PROCEDURE:  40 MIN    POSTOPERATIVE OBSERVATION: 45 MIN (combined recovery with procedure \"A\").    SPECIMENS:  SEE MOHS MAP    COMPLICATIONS:  NONE    DESCRIPTION OF PROCEDURE:  The patient was given a mirror, as appropriate, and the biopsy site was identified, marked with a surgical marking pen, and verified by the patient.   Options for treatment were discussed and the patient was informed that Mohs surgery was the selected treatment based on its lower recurrence rate, given the features listed above, as compared to other treatment modalities such as excision, radiation, or curettage, and agreed with this treatment plan.  Risks and benefits including bruising, swelling, bleeding, infection, nerve injury, recurrence, and scarring were discussed with the patient prior to the procedure and a written consent detailing these and other risks was reviewed with the patient and signed.    There was a time out for person and procedure verification.  The surgical site was prepped with an antiseptic solution.  Application of an antiseptic solution was repeated before each surgical stage.      Stage I:  The

## 2024-01-04 NOTE — PROGRESS NOTES
MOHS PROCEDURE NOTE    PHYSICIAN:  Myra Presley MD, Who operated in two distinct and integrated capacities as the surgeon removing the tissue and as the pathologist examining the tissue.    ASSISTANT: Dick Rey RN     REFERRING PROVIDER:   Gomez Patrick MD    PREOPERATIVE DIAGNOSIS: Superficial Basal Cell Carcinoma     SPECIFIC MOHS INDICATIONS:  location and need for tissue conservation    AUC SCORIN/9    POSTOPERATIVE DIAGNOSIS: SAME    LOCATION: Right Brow (A)    OPERATIVE PROCEDURE:  MOHS MICROGRAPHIC SURGERY    RECONSTRUCTION OF DEFECT: Intermediate layered closure    PREOPERATIVE SIZE: 8 x 5 MM    DEFECT SIZE: 11 x 8 MM    LENGTH OF REPAIRED WOUND/SIZE OF FLAP/SIZE OF GRAFT:  32 MM    ANESTHESIA:  2 mL 1% lidocaine with epinephrine 1:100,000 buffered.     EBL:  MINIMAL    DURATION OF PROCEDURE:  40 MIN    POSTOPERATIVE OBSERVATION: 45 MIN (combined recovery with procedure \"B\").    SPECIMENS:  SEE MOHS MAP    COMPLICATIONS:  NONE    DESCRIPTION OF PROCEDURE:  The patient was given a mirror, as appropriate, and the biopsy site was identified, marked with a surgical marking pen, and verified by the patient.   Options for treatment were discussed and the patient was informed that Mohs surgery was the selected treatment based on its lower recurrence rate, given the features listed above, as compared to other treatment modalities such as excision, radiation, or curettage, and agreed with this treatment plan.  Risks and benefits including bruising, swelling, bleeding, infection, nerve injury, recurrence, and scarring were discussed with the patient prior to the procedure and a written consent detailing these and other risks was reviewed with the patient and signed.    There was a time out for person and procedure verification.  The surgical site was prepped with an antiseptic solution.  Application of an antiseptic solution was repeated before each surgical stage.      Stage I:  The

## 2024-01-04 NOTE — PROGRESS NOTES
PRE-PROCEDURE SCREENING    Pacemaker/ICD: No  Difficulty with numbing in the past: No  Local Anesthesia Reaction/passing out: No  Latex or adhesive allergy:  No  Bleeding/Clotting Disorders: No  Anticoagulant Therapy: No  Joint prosthesis: No  Artificial Heart Valve: No  Stroke or Seizures: No  Organ Transplant or Lymphoma: No  Immunosuppression: No  Respiratory Problems: No

## 2024-01-05 ENCOUNTER — TELEPHONE (OUTPATIENT)
Dept: SURGERY | Age: 74
End: 2024-01-05

## 2024-01-05 NOTE — TELEPHONE ENCOUNTER
The patient was in the office on 1/4/24 for MOHS procedure located on the R Nasal Root and R Brow with ILC x 2 repair.  The patient tolerated the procedure well and left the office in good condition.    Pain level on post-operative day 1:  none     Any bleeding episode that required pressure to be held, bandage change or a call to the office or MD?  no     Any other issues?:  yes - swelling and bruising of right eyelid but patient states he knew to expect that.    A post-operative telephone call was placed at 10:33 a.m. in order to check on the patient's recovery process.  The patient reported doing well and had no complaints other than those listed above, if any.  All of the patient's questions were answered.

## 2024-01-15 ENCOUNTER — OFFICE VISIT (OUTPATIENT)
Dept: ORTHOPEDIC SURGERY | Age: 74
End: 2024-01-15
Payer: MEDICARE

## 2024-01-15 VITALS — BODY MASS INDEX: 26.28 KG/M2 | WEIGHT: 194 LBS | RESPIRATION RATE: 16 BRPM | HEIGHT: 72 IN

## 2024-01-15 DIAGNOSIS — M19.041 OSTEOARTHRITIS, HAND, PRIMARY LOCALIZED, RIGHT: Primary | ICD-10-CM

## 2024-01-15 PROCEDURE — 1036F TOBACCO NON-USER: CPT | Performed by: ORTHOPAEDIC SURGERY

## 2024-01-15 PROCEDURE — G8428 CUR MEDS NOT DOCUMENT: HCPCS | Performed by: ORTHOPAEDIC SURGERY

## 2024-01-15 PROCEDURE — 1123F ACP DISCUSS/DSCN MKR DOCD: CPT | Performed by: ORTHOPAEDIC SURGERY

## 2024-01-15 PROCEDURE — 3017F COLORECTAL CA SCREEN DOC REV: CPT | Performed by: ORTHOPAEDIC SURGERY

## 2024-01-15 PROCEDURE — G8417 CALC BMI ABV UP PARAM F/U: HCPCS | Performed by: ORTHOPAEDIC SURGERY

## 2024-01-15 PROCEDURE — G8484 FLU IMMUNIZE NO ADMIN: HCPCS | Performed by: ORTHOPAEDIC SURGERY

## 2024-01-15 PROCEDURE — 99213 OFFICE O/P EST LOW 20 MIN: CPT | Performed by: ORTHOPAEDIC SURGERY

## 2024-01-16 NOTE — PROGRESS NOTES
Mr. Michael London  is seen today in follow up of his right Ring Finger and Small Finger MCP Joint arthritic symptoms.  He was last evaluated by Dr. David Shoemaker in December 2023, at that time, he was treated with conservative measures.  He reports moderate relief of his symptoms and has not noted any recurrence of symptoms at this time.  He reports moderate pain of the Ring Finger and Small Finger MCP Joint with handshaking and gripping, no tenderness of the remaining hand, wrist, or elbow.  He  notes today, no neurologic symptoms in the hand. Symptoms are improving over time.  Due to the dynamic and progressive nature of Digital Osteoarthritis, It is clinically necessary to carefully re-evaluate Mr. Michael London today, prior to proceeding with any further treatment or intervention, to assure the clinical appropriateness of any previously considered treatment, at this time.      I have today reviewed with Michael London the clinically relevant, past medical history, medications, allergies,  family history, social history, and Review Of Systems & I have documented any details relevant to today's presenting complaints in my history above.  Mr. Michael London's self-reported past medical history, medications, allergies,  family history, social history, and Review Of Systems have been scanned into the chart under the \"Media\" tab.    Physical Exam:  Vitals  Respirations: 16  Height: 182.9 cm (6')  Weight - Scale: 88 kg (194 lb)  Mr. Michael London appears well, he is in no apparent distress, he demonstrates appropriate mood & affect.      Skin: Normal in appearance, Normal Color, and Free of Lesions Bilaterally   Digital range of motion is limited by Osteoarthritis Index Finger, Middle Finger, greater than Ring Finger, and Small Finger MCP Joint on the Right, greater than Left  Wrist range of motion is without significant limitation bilaterally.  There is no evidence of gross joint instability 
No

## 2024-01-16 NOTE — PATIENT INSTRUCTIONS
Thank you for choosing [facility] for your Hand and Upper Extremity needs.  If we can be of any further assistance to you, please do not hesitate to contact us.    Office Phone Number:  [phone]  or  [phone]

## 2024-01-29 ENCOUNTER — TELEPHONE (OUTPATIENT)
Dept: DERMATOLOGY | Age: 74
End: 2024-01-29

## 2024-01-29 NOTE — TELEPHONE ENCOUNTER
Pt calling states he has a spot on his left arm have some concern pls return call back @ [phone]

## 2024-02-14 ENCOUNTER — OFFICE VISIT (OUTPATIENT)
Dept: DERMATOLOGY | Age: 74
End: 2024-02-14
Payer: MEDICARE

## 2024-02-14 DIAGNOSIS — D48.5 NEOPLASM OF UNCERTAIN BEHAVIOR OF SKIN: Primary | ICD-10-CM

## 2024-02-14 PROCEDURE — 11102 TANGNTL BX SKIN SINGLE LES: CPT | Performed by: DERMATOLOGY

## 2024-02-14 NOTE — PROGRESS NOTES
[facility]  Gomez Patrick MD  [phone]      Michael London  1950    73 y.o. male     Date of Visit: 2/14/2024    Chief Complaint: skin lesion    History of Present Illness:    He presents today for a nonhealing lesion on left forearm.    Derm hx:  He has a history of actinic keratoses-treated with photodynamic therapy and Efudex in the past.  Nodular BCC on the right nasal root-treated with Mohs by Dr. Presley on 1/4/2024.  Superficial BCC on the right brow-treated with Mohs by Dr. Presley on 1/4/2024.  Nodular BCC on the right distal nasal dorsum-treated with Mohs by Dr. Presley on 12/18/2023.  Nodular BCC on the right nasal alar crease-treated with Mohs by Dr. Presley on 12/18/2023.    Superficial BCC of the right upper back-treated with curettage on 10/5/2023.  Superficial BCC on the dorsal surface of the left hand-treated with curettage on 4/14/2023.  Superficial BCC on the left lower back - excised by Dr. Walton in August of 2018.      Review of Systems:  Gen: Feels well, good sense of health.      Past Medical History, Family History, Surgical History, Medications and Allergies reviewed.    Past Medical History:   Diagnosis Date    Colorectal polyps 01/01/2003    Hx of colonoscopy 10/06/2003    Hyperlipidemia 12/21/2010    Hypothyroidism     Torn tendon 2023    right hand, wears support brace     Past Surgical History:   Procedure Laterality Date    COLONOSCOPY  2000,10/30/2012    neg  q10    CYSTOSCOPY  04/12/2022    Bimal--gross hematuria--large vascular prostate(cause of bleeding)    HEMORRHOID SURGERY  1961    HERNIA REPAIR  1990; 2003    MOHS SURGERY Right 05/08/2023    nasal alar crease    MOHS SURGERY Right 12/18/2023    MOHS x 2: R nasal root, R distal nasal dorsum    MOHS SURGERY Right 01/04/2024    MOHS x 2: R nasal root and R brow done by Dr. Myra Presley       No Known Allergies  Outpatient Medications Marked as Taking for the 2/14/24 encounter (Office Visit) with

## 2024-02-14 NOTE — PATIENT INSTRUCTIONS
Biopsy Wound Care Instructions    Keep the bandage in place for 24 hours.   Cleanse the wound with mild soapy water daily  Gently dry the area.  Apply Vaseline or petroleum jelly to the wound using a cotton tipped applicator.  Cover with a clean bandage.  Repeat this process until the biopsy site is healed.  If you had stitches placed, continue treating the site until the stitches are removed. Remember to make an appointment to return to have your stitches removed by our staff.  You may shower and bathe as usual.       ** Biopsy results generally take around 7 business days to come back.  If you have not heard from us by then, please call the office at [phone].    *Please note that biopsy results are released to both the patient and physician at the same time in [place].  Please allow time for your physician to review the results.  One of our staff members will reach out to you with the results and plan.

## 2024-02-21 RX ORDER — LEVOTHYROXINE SODIUM 0.12 MG/1
TABLET ORAL
Qty: 90 TABLET | Refills: 3 | Status: SHIPPED | OUTPATIENT
Start: 2024-02-21

## 2024-05-08 ENCOUNTER — TELEPHONE (OUTPATIENT)
Dept: FAMILY MEDICINE CLINIC | Age: 74
End: 2024-05-08

## 2024-05-08 RX ORDER — SCOLOPAMINE TRANSDERMAL SYSTEM 1 MG/1
1 PATCH, EXTENDED RELEASE TRANSDERMAL
Qty: 7 PATCH | Refills: 1 | Status: SHIPPED | OUTPATIENT
Start: 2024-05-08

## 2024-05-08 NOTE — TELEPHONE ENCOUNTER
Called and notified pt  
Pt has began scuba diving, but he gets bad sea sickness and was told about a patch that could help. He was told to reach out to his PCP about it, please advise.  
sent  
mitral regurgitation

## 2024-07-02 ENCOUNTER — HOSPITAL ENCOUNTER (OUTPATIENT)
Dept: GENERAL RADIOLOGY | Age: 74
Discharge: HOME | End: 2024-07-02
Payer: MEDICARE

## 2024-07-02 ENCOUNTER — OFFICE VISIT (OUTPATIENT)
Dept: FAMILY MEDICINE CLINIC | Age: 74
End: 2024-07-02

## 2024-07-02 ENCOUNTER — TELEPHONE (OUTPATIENT)
Dept: FAMILY MEDICINE CLINIC | Age: 74
End: 2024-07-02

## 2024-07-02 VITALS
DIASTOLIC BLOOD PRESSURE: 68 MMHG | HEART RATE: 67 BPM | WEIGHT: 199.3 LBS | OXYGEN SATURATION: 94 % | BODY MASS INDEX: 27.03 KG/M2 | SYSTOLIC BLOOD PRESSURE: 106 MMHG

## 2024-07-02 DIAGNOSIS — M25.512 ACUTE PAIN OF LEFT SHOULDER: Primary | ICD-10-CM

## 2024-07-02 DIAGNOSIS — M25.512 ACUTE PAIN OF LEFT SHOULDER: ICD-10-CM

## 2024-07-02 PROCEDURE — 72074 X-RAY EXAM THORAC SPINE4/>VW: CPT

## 2024-07-02 PROCEDURE — 73030 X-RAY EXAM OF SHOULDER: CPT

## 2024-07-02 PROCEDURE — 72050 X-RAY EXAM NECK SPINE 4/5VWS: CPT

## 2024-07-02 RX ORDER — ACETAMINOPHEN 500 MG
500 TABLET ORAL 4 TIMES DAILY PRN
Qty: 120 TABLET | Refills: 5 | Status: SHIPPED | OUTPATIENT
Start: 2024-07-02

## 2024-07-02 RX ORDER — NAPROXEN 250 MG/1
250 TABLET ORAL 2 TIMES DAILY PRN
Qty: 180 TABLET | Refills: 1 | Status: SHIPPED | OUTPATIENT
Start: 2024-07-02

## 2024-07-02 SDOH — ECONOMIC STABILITY: FOOD INSECURITY: WITHIN THE PAST 12 MONTHS, THE FOOD YOU BOUGHT JUST DIDN'T LAST AND YOU DIDN'T HAVE MONEY TO GET MORE.: NEVER TRUE

## 2024-07-02 SDOH — ECONOMIC STABILITY: HOUSING INSECURITY
IN THE LAST 12 MONTHS, WAS THERE A TIME WHEN YOU DID NOT HAVE A STEADY PLACE TO SLEEP OR SLEPT IN A SHELTER (INCLUDING NOW)?: NO

## 2024-07-02 SDOH — ECONOMIC STABILITY: INCOME INSECURITY: HOW HARD IS IT FOR YOU TO PAY FOR THE VERY BASICS LIKE FOOD, HOUSING, MEDICAL CARE, AND HEATING?: NOT HARD AT ALL

## 2024-07-02 SDOH — ECONOMIC STABILITY: FOOD INSECURITY: WITHIN THE PAST 12 MONTHS, YOU WORRIED THAT YOUR FOOD WOULD RUN OUT BEFORE YOU GOT MONEY TO BUY MORE.: NEVER TRUE

## 2024-07-02 ASSESSMENT — PATIENT HEALTH QUESTIONNAIRE - PHQ9
SUM OF ALL RESPONSES TO PHQ QUESTIONS 1-9: 0
SUM OF ALL RESPONSES TO PHQ QUESTIONS 1-9: 0
1. LITTLE INTEREST OR PLEASURE IN DOING THINGS: NOT AT ALL
2. FEELING DOWN, DEPRESSED OR HOPELESS: NOT AT ALL
SUM OF ALL RESPONSES TO PHQ QUESTIONS 1-9: 0
SUM OF ALL RESPONSES TO PHQ9 QUESTIONS 1 & 2: 0
SUM OF ALL RESPONSES TO PHQ QUESTIONS 1-9: 0

## 2024-07-02 NOTE — TELEPHONE ENCOUNTER
Pt said he is struggling with severe pain in his left shoulder towards the back. Pt said it is not a constant pain but when he's moving it, it causes him so much pain it \"almost knock him out\". Pt wants to know if Dr. Andres could put a referral in for him

## 2024-07-02 NOTE — TELEPHONE ENCOUNTER
Called and spoke with pt and informed him that an appointment will be needed as he has not been seen in the office so quite some time now.     Pt scheduled with Dr. Haley for an acute visit so that appropriate treatment and evaluation can be done.

## 2024-07-02 NOTE — PROGRESS NOTES
Chief Complaint   Patient presents with    Back Pain     Pt states that he injured his back/shoulder and feels like he tore or pulled something, does not hurt all the time just when he turns a certain way.           HPI: Michael London is a 74 y.o. male who presents for Shoulder pain    #Shoulder pain  - Due for updated labs, states has severe pain in left shoulder, has trouble moving associated arm, states is also on left thoracic spine, was paddling on a river recently, leaned to the side and feels like he felt too far, feels worse with movement, is a jolt of pain, is taking 400 mg Q8 hours, is not taking Tylenol, does not have nausea or vomiting, denies weakness  - Is not coming in a spasm    Lab Results   Component Value Date    CREATININE 1.0 08/15/2022    BUN 14 08/15/2022     (H) 08/15/2022    K 4.4 08/15/2022     08/15/2022    CO2 24 08/15/2022        Past Medical History:   Diagnosis Date    Colorectal polyps 01/01/2003    Hx of colonoscopy 10/06/2003    Hyperlipidemia 12/21/2010    Hypothyroidism     Torn tendon 2023    right hand, wears support brace       Past Surgical History:   Procedure Laterality Date    COLONOSCOPY  2000,10/30/2012    neg  q10    CYSTOSCOPY  04/12/2022    Bimal--gross hematuria--large vascular prostate(cause of bleeding)    HEMORRHOID SURGERY  1961    HERNIA REPAIR  1990; 2003    MOHS SURGERY Right 05/08/2023    nasal alar crease    MOHS SURGERY Right 12/18/2023    MOHS x 2: R nasal root, R distal nasal dorsum    MOHS SURGERY Right 01/04/2024    MOHS x 2: R nasal root and R brow done by Dr. Myra Presley      Current Outpatient Medications   Medication Sig Dispense Refill    naproxen (NAPROSYN) 250 MG tablet Take 1 tablet by mouth 2 times daily as needed for Pain 180 tablet 1    acetaminophen (TYLENOL) 500 MG tablet Take 1 tablet by mouth 4 times daily as needed for Pain 120 tablet 5    scopolamine (TRANSDERM-SCOP) transdermal patch Place 1 patch onto the skin

## 2024-07-09 ENCOUNTER — HOSPITAL ENCOUNTER (OUTPATIENT)
Dept: PHYSICAL THERAPY | Age: 74
Setting detail: THERAPIES SERIES
Discharge: HOME | End: 2024-07-09
Payer: MEDICARE

## 2024-07-09 PROCEDURE — 97110 THERAPEUTIC EXERCISES: CPT

## 2024-07-09 PROCEDURE — 97161 PT EVAL LOW COMPLEX 20 MIN: CPT

## 2024-07-09 NOTE — PLAN OF CARE
in HEP and progression per patient tolerance, in order to prevent re-injury.   [] Progressing: [] Met: [x] Not Met: [] Adjusted  2. Patient will have a decrease in pain to <5/10 to facilitate improvement in movement, function, and ADLs as indicated by Functional Deficits.  [] Progressing: [] Met: [x] Not Met: [] Adjusted    Long Term Goals: To be achieved in: 4-6 weeks  1. Disability index score of 15% or less for the Upper Extremity functional Scale to assist with reaching prior level of function with activities such as Reaching, housework, and boating.  [] Progressing: [] Met: [x] Not Met: [] Adjusted  2. Patient will demonstrate increased AROM of Cervical flex, et, rot, and side bending to WNL without pain to allow for proper joint functioning to enable patient to complete ADLs.   [] Progressing: [] Met: [x] Not Met: [] Adjusted  3. Patient will demonstrate increased Strength of L shoulder ABD, ER, Flex to at least 4+/5 throughout without pain to allow for proper functional mobility to enable patient to return to Lifting weights.   [] Progressing: [] Met: [x] Not Met: [] Adjusted      Overall Progression Towards Functional goals/ Treatment Progress Update:  [] Patient is progressing as expected towards functional goals listed.    [] Progression is slowed due to complexities/Impairments listed.  [] Progression has been slowed due to co-morbidities.  [x] Plan just implemented, too soon (<30days) to assess goals progression   [] Goals require adjustment due to lack of progress  [] Patient is not progressing as expected and requires additional follow up with physician  [] Other:     TREATMENT PLAN     Frequency/Duration: 1x/week for 4-6 weeks for the following treatment interventions:    Interventions:  Therapeutic Exercise (38556) including: strength training, ROM, and functional mobility  Therapeutic Activities (67676) including: functional mobility training and education.  Neuromuscular Re-education (55978)

## 2024-07-15 ENCOUNTER — TELEPHONE (OUTPATIENT)
Dept: DERMATOLOGY | Age: 74
End: 2024-07-15

## 2024-07-15 NOTE — TELEPHONE ENCOUNTER
Multiple (20-40) small raised growths under armpits. Noticed about a month ago, stopped using deodorant and scrubbed harder but getting worse.Bumps are on skin rather than under. States doesn't appear to be infected.     Pt is only 15 minutes away if have cancellation.     [phone]

## 2024-07-18 ENCOUNTER — HOSPITAL ENCOUNTER (OUTPATIENT)
Dept: PHYSICAL THERAPY | Age: 74
Setting detail: THERAPIES SERIES
Discharge: HOME | End: 2024-07-18
Payer: MEDICARE

## 2024-07-18 PROCEDURE — 97140 MANUAL THERAPY 1/> REGIONS: CPT

## 2024-07-18 PROCEDURE — 97110 THERAPEUTIC EXERCISES: CPT

## 2024-07-18 NOTE — FLOWSHEET NOTE
without pain to allow for proper joint functioning to enable patient to complete ADLs.   [] Progressing: [] Met: [x] Not Met: [] Adjusted  3. Patient will demonstrate increased Strength of L shoulder ABD, ER, Flex to at least 4+/5 throughout without pain to allow for proper functional mobility to enable patient to return to Lifting weights.   [] Progressing: [] Met: [x] Not Met: [] Adjusted      Overall Progression Towards Functional goals/ Treatment Progress Update:  [] Patient is progressing as expected towards functional goals listed.    [] Progression is slowed due to complexities/Impairments listed.  [] Progression has been slowed due to co-morbidities.  [x] Plan just implemented, too soon (<30days) to assess goals progression   [] Goals require adjustment due to lack of progress  [] Patient is not progressing as expected and requires additional follow up with physician  [] Other:     TREATMENT PLAN     Frequency/Duration: 1x/week for 4-6 weeks for the following treatment interventions:    Interventions:  Therapeutic Exercise (80197) including: strength training, ROM, and functional mobility  Therapeutic Activities (41876) including: functional mobility training and education.  Neuromuscular Re-education (11763) activation and proprioception, including postural re-education.    Manual Therapy (99067) as indicated to include: Passive Range of Motion, Gr I-IV mobilizations, and Soft Tissue Mobilization    Plan: POC initiated as per evaluation    Electronically Signed by AKUA BURRELL, PT  Date: 07/18/2024     Note: Portions of this note have been templated and/or copied from initial evaluation, reassessments and prior notes for documentation efficiency.    Note: If patient does not return for scheduled/recommended follow up visits, this note will serve as a discharge from care along with the most recent update on progress.    Ortho Evaluation

## 2024-07-24 ENCOUNTER — OFFICE VISIT (OUTPATIENT)
Dept: DERMATOLOGY | Age: 74
End: 2024-07-24
Payer: MEDICARE

## 2024-07-24 DIAGNOSIS — L82.0 INFLAMED SEBORRHEIC KERATOSIS: Primary | ICD-10-CM

## 2024-07-24 DIAGNOSIS — L57.0 ACTINIC KERATOSIS: ICD-10-CM

## 2024-07-24 DIAGNOSIS — C44.519 BASAL CELL CARCINOMA (BCC) OF UPPER BACK: ICD-10-CM

## 2024-07-24 PROCEDURE — 17110 DESTRUCTION B9 LES UP TO 14: CPT | Performed by: DERMATOLOGY

## 2024-07-24 PROCEDURE — 17003 DESTRUCT PREMALG LES 2-14: CPT | Performed by: DERMATOLOGY

## 2024-07-24 PROCEDURE — 17000 DESTRUCT PREMALG LESION: CPT | Performed by: DERMATOLOGY

## 2024-07-24 PROCEDURE — 17261 DSTRJ MAL LES T/A/L .6-1.0CM: CPT | Performed by: DERMATOLOGY

## 2024-07-24 NOTE — PATIENT INSTRUCTIONS
Biopsy Wound Care Instructions    Keep the bandage in place for 24 hours.   Cleanse the wound with mild soapy water daily  Gently dry the area.  Apply Vaseline or petroleum jelly to the wound using a cotton tipped applicator.  Cover with a clean bandage.  Repeat this process until the biopsy site is healed.  If you had stitches placed, continue treating the site until the stitches are removed. Remember to make an appointment to return to have your stitches removed by our staff.  You may shower and bathe as usual.       ** Biopsy results generally take around 7 business days to come back.  If you have not heard from us by then, please call the office at [phone].    *Please note that biopsy results are released to both the patient and physician at the same time in [place].  Please allow time for your physician to review the results.  One of our staff members will reach out to you with the results and plan.

## 2024-07-24 NOTE — PROGRESS NOTES
[facility]  Goemz Patrick MD  [phone]      Michael London  1950    74 y.o. male     Date of Visit: 7/24/2024    Chief Complaint: skin lesions    History of Present Illness:    1.  He presents today for multiple inflamed, pruritic and irritated lesions in the axillae.    2.  Unknown duration of an asymptomatic pink lesion on the left upper back.    3.  Also has a history of actinic keratoses-treated with cryotherapy, photodynamic therapy and Efudex in the past.  He has several lesions on the face today.    Derm hx:  He has a history of actinic keratoses-treated with photodynamic therapy and Efudex in the past.    Nodular BCC on the right nasal root-treated with Mohs by Dr. Presley on 1/4/2024.  Superficial BCC on the right brow-treated with Mohs by Dr. Presley on 1/4/2024.  Nodular BCC on the right distal nasal dorsum-treated with Mohs by Dr. Presley on 12/18/2023.  Nodular BCC on the right nasal alar crease-treated with Mohs by Dr. Presley on 12/18/2023.     Superficial BCC of the right upper back-treated with curettage on 10/5/2023.  Superficial BCC on the dorsal surface of the left hand-treated with curettage on 4/14/2023.  Superficial BCC on the left lower back - excised by Dr. Walton in August of 2018.      Review of Systems:  Gen: Feels well, good sense of health.    Past Medical History, Family History, Surgical History, Medications and Allergies reviewed.    Past Medical History:   Diagnosis Date    Colorectal polyps 01/01/2003    Hx of colonoscopy 10/06/2003    Hyperlipidemia 12/21/2010    Hypothyroidism     Torn tendon 2023    right hand, wears support brace     Past Surgical History:   Procedure Laterality Date    COLONOSCOPY  2000,10/30/2012    neg  q10    CYSTOSCOPY  04/12/2022    Bimal--gross hematuria--large vascular prostate(cause of bleeding)    HEMORRHOID SURGERY  1961    HERNIA REPAIR  1990; 2003    MOHS SURGERY Right 05/08/2023    nasal alar crease    MOHS SURGERY

## 2024-07-25 ENCOUNTER — HOSPITAL ENCOUNTER (OUTPATIENT)
Dept: PHYSICAL THERAPY | Age: 74
Setting detail: THERAPIES SERIES
Discharge: HOME | End: 2024-07-25
Payer: MEDICARE

## 2024-07-25 PROCEDURE — 97110 THERAPEUTIC EXERCISES: CPT

## 2024-07-25 PROCEDURE — 97140 MANUAL THERAPY 1/> REGIONS: CPT

## 2024-07-25 NOTE — FLOWSHEET NOTE
proper functional mobility to enable patient to return to Lifting weights.   [] Progressing: [] Met: [x] Not Met: [] Adjusted      Overall Progression Towards Functional goals/ Treatment Progress Update:  [] Patient is progressing as expected towards functional goals listed.    [] Progression is slowed due to complexities/Impairments listed.  [] Progression has been slowed due to co-morbidities.  [x] Plan just implemented, too soon (<30days) to assess goals progression   [] Goals require adjustment due to lack of progress  [] Patient is not progressing as expected and requires additional follow up with physician  [] Other:     TREATMENT PLAN     Frequency/Duration: 1x/week for 4-6 weeks for the following treatment interventions:    Interventions:  Therapeutic Exercise (94137) including: strength training, ROM, and functional mobility  Therapeutic Activities (04689) including: functional mobility training and education.  Neuromuscular Re-education (81057) activation and proprioception, including postural re-education.    Manual Therapy (08279) as indicated to include: Passive Range of Motion, Gr I-IV mobilizations, and Soft Tissue Mobilization    Plan: POC initiated as per evaluation    Electronically Signed by AKUA BURRELL, PT  Date: 07/25/2024     Note: Portions of this note have been templated and/or copied from initial evaluation, reassessments and prior notes for documentation efficiency.    Note: If patient does not return for scheduled/recommended follow up visits, this note will serve as a discharge from care along with the most recent update on progress.    Ortho Evaluation

## 2024-08-01 ENCOUNTER — TELEPHONE (OUTPATIENT)
Dept: DERMATOLOGY | Age: 74
End: 2024-08-01

## 2024-08-01 NOTE — TELEPHONE ENCOUNTER
Gomez Patrick MD Eaton, Jennifer, LPN Jenn,    This was my mistake.  I treated his superficial basal cell carcinoma at the time of the visit.  He does not need to return for curettage.    Riki Brown    Advised patient of this and he verbalized understanding.   Cancelled appointment for 8/2/24.

## 2024-08-27 SDOH — HEALTH STABILITY: PHYSICAL HEALTH: ON AVERAGE, HOW MANY MINUTES DO YOU ENGAGE IN EXERCISE AT THIS LEVEL?: 10 MIN

## 2024-08-27 SDOH — HEALTH STABILITY: PHYSICAL HEALTH: ON AVERAGE, HOW MANY DAYS PER WEEK DO YOU ENGAGE IN MODERATE TO STRENUOUS EXERCISE (LIKE A BRISK WALK)?: 3 DAYS

## 2024-08-27 ASSESSMENT — PATIENT HEALTH QUESTIONNAIRE - PHQ9
SUM OF ALL RESPONSES TO PHQ QUESTIONS 1-9: 0
SUM OF ALL RESPONSES TO PHQ QUESTIONS 1-9: 0
1. LITTLE INTEREST OR PLEASURE IN DOING THINGS: NOT AT ALL
2. FEELING DOWN, DEPRESSED OR HOPELESS: NOT AT ALL
SUM OF ALL RESPONSES TO PHQ QUESTIONS 1-9: 0
SUM OF ALL RESPONSES TO PHQ QUESTIONS 1-9: 0
SUM OF ALL RESPONSES TO PHQ9 QUESTIONS 1 & 2: 0

## 2024-08-27 ASSESSMENT — LIFESTYLE VARIABLES
HOW OFTEN DO YOU HAVE A DRINK CONTAINING ALCOHOL: MONTHLY OR LESS
HOW MANY STANDARD DRINKS CONTAINING ALCOHOL DO YOU HAVE ON A TYPICAL DAY: 1
HOW MANY STANDARD DRINKS CONTAINING ALCOHOL DO YOU HAVE ON A TYPICAL DAY: 1 OR 2
HOW OFTEN DO YOU HAVE SIX OR MORE DRINKS ON ONE OCCASION: 1
HOW OFTEN DO YOU HAVE A DRINK CONTAINING ALCOHOL: 2

## 2024-08-28 ENCOUNTER — OFFICE VISIT (OUTPATIENT)
Dept: FAMILY MEDICINE CLINIC | Age: 74
End: 2024-08-28
Payer: MEDICARE

## 2024-08-28 VITALS
DIASTOLIC BLOOD PRESSURE: 82 MMHG | TEMPERATURE: 98.1 F | HEART RATE: 65 BPM | OXYGEN SATURATION: 96 % | WEIGHT: 196 LBS | BODY MASS INDEX: 26.55 KG/M2 | HEIGHT: 72 IN | SYSTOLIC BLOOD PRESSURE: 138 MMHG

## 2024-08-28 DIAGNOSIS — K63.5 POLYP OF COLON, UNSPECIFIED PART OF COLON, UNSPECIFIED TYPE: ICD-10-CM

## 2024-08-28 DIAGNOSIS — E78.2 MIXED HYPERLIPIDEMIA: ICD-10-CM

## 2024-08-28 DIAGNOSIS — N52.8 OTHER MALE ERECTILE DYSFUNCTION: ICD-10-CM

## 2024-08-28 DIAGNOSIS — Z00.00 WELL ADULT EXAM: ICD-10-CM

## 2024-08-28 DIAGNOSIS — Z00.00 MEDICARE ANNUAL WELLNESS VISIT, SUBSEQUENT: Primary | ICD-10-CM

## 2024-08-28 DIAGNOSIS — E03.9 HYPOTHYROIDISM, UNSPECIFIED TYPE: ICD-10-CM

## 2024-08-28 PROBLEM — Q38.3 TONGUE ABNORMALITY: Status: RESOLVED | Noted: 2022-08-15 | Resolved: 2024-08-28

## 2024-08-28 PROCEDURE — G0439 PPPS, SUBSEQ VISIT: HCPCS | Performed by: FAMILY MEDICINE

## 2024-08-28 PROCEDURE — 1123F ACP DISCUSS/DSCN MKR DOCD: CPT | Performed by: FAMILY MEDICINE

## 2024-08-28 PROCEDURE — 3017F COLORECTAL CA SCREEN DOC REV: CPT | Performed by: FAMILY MEDICINE

## 2024-08-28 RX ORDER — SILDENAFIL 100 MG/1
100 TABLET, FILM COATED ORAL PRN
Qty: 30 TABLET | Refills: 3 | Status: SHIPPED | OUTPATIENT
Start: 2024-08-28

## 2024-08-28 NOTE — PROGRESS NOTES
Medicare Annual Wellness Visit    Michael London is here for Medicare AWV    Assessment & Plan   Hypothyroidism, unspecified type  Assessment & Plan:   Unclear control, changes made today: labs  Mixed hyperlipidemia  Assessment & Plan:   Uncontrolled, changes made today: labs  Polyp of colon, unspecified part of colon, unspecified type  Assessment & Plan:    Recheck now--referred  Other male erectile dysfunction  Assessment & Plan:   Uncontrolled, continue current medications-refill--labs  Well adult exam  Assessment & Plan:    labs    Recommendations for Preventive Services Due: see orders and patient instructions/AVS.  Recommended screening schedule for the next 5-10 years is provided to the patient in written form: see Patient Instructions/AVS.     No follow-ups on file.     Subjective   The following acute and/or chronic problems were also addressed today:  Hypothyroid   Unclear control, changes made today: labs    Hyperlipidemia   Uncontrolled, changes made today: labs    Colon polyp    Recheck now--referred    Other male erectile dysfunction   Uncontrolled, continue current medications-refill--labs    Well adult exam    labs      Patient's complete Health Risk Assessment and screening values have been reviewed and are found in Flowsheets. The following problems were reviewed today and where indicated follow up appointments were made and/or referrals ordered.    No Positive Risk Factors identified today.                                    Objective   Vitals:    08/28/24 0934   BP: 138/82   Pulse: 65   Temp: 98.1 °F (36.7 °C)   TempSrc: Temporal   SpO2: 96%   Weight: 88.9 kg (196 lb)   Height: 1.829 m (6')      Body mass index is 26.58 kg/m².        General Appearance: alert and oriented to person, place and time, well developed and well- nourished, in no acute distress  Skin: warm and dry, no rash or erythema  Head: normocephalic and atraumatic  Eyes: pupils equal, round, and reactive to light, extraocular eye

## 2024-08-29 DIAGNOSIS — E78.2 MIXED HYPERLIPIDEMIA: ICD-10-CM

## 2024-08-29 DIAGNOSIS — K63.5 POLYP OF COLON, UNSPECIFIED PART OF COLON, UNSPECIFIED TYPE: ICD-10-CM

## 2024-08-29 DIAGNOSIS — E03.9 HYPOTHYROIDISM, UNSPECIFIED TYPE: ICD-10-CM

## 2024-08-29 LAB
ALBUMIN SERPL-MCNC: 4.1 G/DL (ref 3.4–5)
ALBUMIN/GLOB SERPL: 1.9 {RATIO} (ref 1.1–2.2)
ALP SERPL-CCNC: 59 U/L (ref 40–129)
ALT SERPL-CCNC: 23 U/L (ref 10–40)
ANION GAP SERPL CALCULATED.3IONS-SCNC: 11 MMOL/L (ref 3–16)
AST SERPL-CCNC: 19 U/L (ref 15–37)
BASOPHILS # BLD: 0 K/UL (ref 0–0.2)
BASOPHILS NFR BLD: 0.8 %
BILIRUB SERPL-MCNC: 0.3 MG/DL (ref 0–1)
BUN SERPL-MCNC: 17 MG/DL (ref 7–20)
CALCIUM SERPL-MCNC: 9 MG/DL (ref 8.3–10.6)
CHLORIDE SERPL-SCNC: 102 MMOL/L (ref 99–110)
CHOLEST SERPL-MCNC: 228 MG/DL (ref 0–199)
CO2 SERPL-SCNC: 25 MMOL/L (ref 21–32)
CREAT SERPL-MCNC: 1 MG/DL (ref 0.8–1.3)
DEPRECATED RDW RBC AUTO: 13.5 % (ref 12.4–15.4)
EOSINOPHIL # BLD: 0.2 K/UL (ref 0–0.6)
EOSINOPHIL NFR BLD: 4.1 %
GFR SERPLBLD CREATININE-BSD FMLA CKD-EPI: 78 ML/MIN/{1.73_M2}
GLUCOSE SERPL-MCNC: 81 MG/DL (ref 70–99)
HCT VFR BLD AUTO: 43.6 % (ref 40.5–52.5)
HDLC SERPL-MCNC: 43 MG/DL (ref 40–60)
HGB BLD-MCNC: 14.7 G/DL (ref 13.5–17.5)
LDLC SERPL CALC-MCNC: ABNORMAL MG/DL
LDLC SERPL-MCNC: 124 MG/DL
LYMPHOCYTES # BLD: 1.4 K/UL (ref 1–5.1)
LYMPHOCYTES NFR BLD: 23 %
MCH RBC QN AUTO: 31.7 PG (ref 26–34)
MCHC RBC AUTO-ENTMCNC: 33.6 G/DL (ref 31–36)
MCV RBC AUTO: 94.3 FL (ref 80–100)
MONOCYTES # BLD: 0.6 K/UL (ref 0–1.3)
MONOCYTES NFR BLD: 10.4 %
NEUTROPHILS # BLD: 3.6 K/UL (ref 1.7–7.7)
NEUTROPHILS NFR BLD: 61.7 %
PLATELET # BLD AUTO: 209 K/UL (ref 135–450)
PMV BLD AUTO: 9 FL (ref 5–10.5)
POTASSIUM SERPL-SCNC: 4.3 MMOL/L (ref 3.5–5.1)
PROT SERPL-MCNC: 6.3 G/DL (ref 6.4–8.2)
RBC # BLD AUTO: 4.63 M/UL (ref 4.2–5.9)
SODIUM SERPL-SCNC: 138 MMOL/L (ref 136–145)
T4 FREE SERPL-MCNC: 1.3 NG/DL (ref 0.9–1.8)
TRIGL SERPL-MCNC: 318 MG/DL (ref 0–150)
TSH SERPL DL<=0.005 MIU/L-ACNC: 1.56 UIU/ML (ref 0.27–4.2)
VLDLC SERPL CALC-MCNC: ABNORMAL MG/DL
WBC # BLD AUTO: 5.9 K/UL (ref 4–11)

## 2024-09-18 ENCOUNTER — ANESTHESIA (OUTPATIENT)
Dept: ENDOSCOPY | Age: 74
End: 2024-09-18
Payer: MEDICARE

## 2024-09-18 ENCOUNTER — HOSPITAL ENCOUNTER (OUTPATIENT)
Age: 74
Setting detail: OUTPATIENT SURGERY
Discharge: HOME | End: 2024-09-18
Attending: INTERNAL MEDICINE | Admitting: INTERNAL MEDICINE
Payer: MEDICARE

## 2024-09-18 ENCOUNTER — ANESTHESIA EVENT (OUTPATIENT)
Dept: ENDOSCOPY | Age: 74
End: 2024-09-18
Payer: MEDICARE

## 2024-09-18 VITALS
OXYGEN SATURATION: 95 % | DIASTOLIC BLOOD PRESSURE: 80 MMHG | TEMPERATURE: 98 F | RESPIRATION RATE: 16 BRPM | HEIGHT: 72 IN | SYSTOLIC BLOOD PRESSURE: 122 MMHG | HEART RATE: 57 BPM | WEIGHT: 190 LBS | BODY MASS INDEX: 25.73 KG/M2

## 2024-09-18 DIAGNOSIS — Z86.010 HISTORY OF COLON POLYPS: ICD-10-CM

## 2024-09-18 PROBLEM — K57.30 COLON, DIVERTICULOSIS: Status: ACTIVE | Noted: 2024-09-18

## 2024-09-18 PROCEDURE — 3700000000 HC ANESTHESIA ATTENDED CARE: Performed by: INTERNAL MEDICINE

## 2024-09-18 PROCEDURE — 2709999900 HC NON-CHARGEABLE SUPPLY: Performed by: INTERNAL MEDICINE

## 2024-09-18 PROCEDURE — 2500000003 HC RX 250 WO HCPCS: Performed by: NURSE ANESTHETIST, CERTIFIED REGISTERED

## 2024-09-18 PROCEDURE — 3700000001 HC ADD 15 MINUTES (ANESTHESIA): Performed by: INTERNAL MEDICINE

## 2024-09-18 PROCEDURE — 7100000011 HC PHASE II RECOVERY - ADDTL 15 MIN: Performed by: INTERNAL MEDICINE

## 2024-09-18 PROCEDURE — 6360000002 HC RX W HCPCS: Performed by: NURSE ANESTHETIST, CERTIFIED REGISTERED

## 2024-09-18 PROCEDURE — 3609010600 HC COLONOSCOPY POLYPECTOMY SNARE/COLD BIOPSY: Performed by: INTERNAL MEDICINE

## 2024-09-18 PROCEDURE — 2580000003 HC RX 258: Performed by: ANESTHESIOLOGY

## 2024-09-18 PROCEDURE — 7100000010 HC PHASE II RECOVERY - FIRST 15 MIN: Performed by: INTERNAL MEDICINE

## 2024-09-18 PROCEDURE — 88305 TISSUE EXAM BY PATHOLOGIST: CPT

## 2024-09-18 RX ORDER — SODIUM CHLORIDE, SODIUM LACTATE, POTASSIUM CHLORIDE, CALCIUM CHLORIDE 600; 310; 30; 20 MG/100ML; MG/100ML; MG/100ML; MG/100ML
INJECTION, SOLUTION INTRAVENOUS CONTINUOUS
Status: DISCONTINUED | OUTPATIENT
Start: 2024-09-18 | End: 2024-09-18 | Stop reason: HOSPADM

## 2024-09-18 RX ORDER — PROPOFOL 10 MG/ML
INJECTION, EMULSION INTRAVENOUS
Status: DISCONTINUED | OUTPATIENT
Start: 2024-09-18 | End: 2024-09-18 | Stop reason: SDUPTHER

## 2024-09-18 RX ORDER — LIDOCAINE HYDROCHLORIDE 20 MG/ML
INJECTION, SOLUTION INFILTRATION; PERINEURAL
Status: DISCONTINUED | OUTPATIENT
Start: 2024-09-18 | End: 2024-09-18 | Stop reason: SDUPTHER

## 2024-09-18 RX ADMIN — PROPOFOL 150 MCG/KG/MIN: 10 INJECTION, EMULSION INTRAVENOUS at 11:02

## 2024-09-18 RX ADMIN — LIDOCAINE HYDROCHLORIDE 80 MG: 20 INJECTION, SOLUTION INFILTRATION; PERINEURAL at 11:02

## 2024-09-18 RX ADMIN — PROPOFOL 50 MG: 10 INJECTION, EMULSION INTRAVENOUS at 11:02

## 2024-09-18 RX ADMIN — SODIUM CHLORIDE, POTASSIUM CHLORIDE, SODIUM LACTATE AND CALCIUM CHLORIDE: 600; 310; 30; 20 INJECTION, SOLUTION INTRAVENOUS at 09:46

## 2024-09-18 ASSESSMENT — PAIN SCALES - GENERAL
PAINLEVEL_OUTOF10: 1
PAINLEVEL_OUTOF10: 0

## 2024-09-18 ASSESSMENT — PAIN DESCRIPTION - DESCRIPTORS: DESCRIPTORS: ACHING

## 2024-09-18 ASSESSMENT — PAIN DESCRIPTION - ONSET: ONSET: ON-GOING

## 2024-09-18 ASSESSMENT — PAIN DESCRIPTION - FREQUENCY: FREQUENCY: CONTINUOUS

## 2024-09-18 ASSESSMENT — PAIN DESCRIPTION - LOCATION: LOCATION: HEAD

## 2024-09-18 ASSESSMENT — PAIN DESCRIPTION - ORIENTATION: ORIENTATION: ANTERIOR

## 2024-09-18 ASSESSMENT — PAIN DESCRIPTION - PAIN TYPE: TYPE: ACUTE PAIN

## 2025-01-22 ENCOUNTER — OFFICE VISIT (OUTPATIENT)
Age: 75
End: 2025-01-22
Payer: MEDICARE

## 2025-01-22 DIAGNOSIS — D48.5 NEOPLASM OF UNCERTAIN BEHAVIOR OF SKIN: Primary | ICD-10-CM

## 2025-01-22 DIAGNOSIS — L85.3 XEROSIS CUTIS: ICD-10-CM

## 2025-01-22 DIAGNOSIS — L57.0 ACTINIC KERATOSIS: ICD-10-CM

## 2025-01-22 PROCEDURE — 99213 OFFICE O/P EST LOW 20 MIN: CPT | Performed by: DERMATOLOGY

## 2025-01-22 PROCEDURE — 1123F ACP DISCUSS/DSCN MKR DOCD: CPT | Performed by: DERMATOLOGY

## 2025-01-22 PROCEDURE — 3017F COLORECTAL CA SCREEN DOC REV: CPT | Performed by: DERMATOLOGY

## 2025-01-22 PROCEDURE — 11103 TANGNTL BX SKIN EA SEP/ADDL: CPT | Performed by: DERMATOLOGY

## 2025-01-22 PROCEDURE — G8417 CALC BMI ABV UP PARAM F/U: HCPCS | Performed by: DERMATOLOGY

## 2025-01-22 PROCEDURE — G8427 DOCREV CUR MEDS BY ELIG CLIN: HCPCS | Performed by: DERMATOLOGY

## 2025-01-22 PROCEDURE — 11102 TANGNTL BX SKIN SINGLE LES: CPT | Performed by: DERMATOLOGY

## 2025-01-22 PROCEDURE — 1159F MED LIST DOCD IN RCRD: CPT | Performed by: DERMATOLOGY

## 2025-01-22 PROCEDURE — 1036F TOBACCO NON-USER: CPT | Performed by: DERMATOLOGY

## 2025-01-22 RX ORDER — FLUOROURACIL 50 MG/G
CREAM TOPICAL
Qty: 40 G | Refills: 0 | Status: SHIPPED | OUTPATIENT
Start: 2025-01-22

## 2025-01-22 NOTE — PATIENT INSTRUCTIONS
Biopsy Wound Care Instructions    Keep the bandage in place for 24 hours.   Cleanse the wound with mild soapy water daily  Gently dry the area.  Apply Vaseline or petroleum jelly to the wound using a cotton tipped applicator.  Cover with a clean bandage.  Repeat this process until the biopsy site is healed.  If you had stitches placed, continue treating the site until the stitches are removed. Remember to make an appointment to return to have your stitches removed by our staff.  You may shower and bathe as usual.       ** Biopsy results generally take around 7 business days to come back.  If you have not heard from us by then, please call the office at [phone].    *Please note that biopsy results are released to both the patient and physician at the same time in [facility].  Please allow time for your physician to review the results.  One of our staff members will reach out to you with the results and plan.     Fluorouracil (Efudex / Carac)    Your physician has prescribed a topical medication that is used to treat pre-cancerous skin lesions and certain types of skin cancers. We are providing you with the following information so that you understand how the medication should be used and potential side effects you may experience.    Clean and dry the areas of the skin that will be treated.     Apply a small amount of the medication to your fingertip. Dab the medication onto the designated areas and rub it in.                                                             Discontinue the medication once the skin starts to scab. Typically this takes between 2 and 4 weeks after starting the medication.      Avoid applying the medication in or around the eyes or eyelids. If the medication gets into your eyes, nose or mouth, rinse immediately.                                                   Wash your hands immediately after application.     Side effects include: burning, redness, irritation, dryness, pain, swelling

## 2025-01-22 NOTE — PROGRESS NOTES
colonoscopy 10/06/2003    Hyperlipidemia 12/21/2010    Hypothyroidism     Torn tendon 2023    right hand, wears support brace     Past Surgical History:   Procedure Laterality Date    APPENDECTOMY      COLON SURGERY      colectomy; telescoped colon and had a piece removed at age 12    COLONOSCOPY  2000,10/30/2012    neg  q10    COLONOSCOPY N/A 9/18/2024    COLONOSCOPY POLYPECTOMY COLD SNARE/BIOPSY performed by Martín Maldonado MD at [facility] ENDOSCOPY    CYSTOSCOPY  04/12/2022    Bimal--gross hematuria--large vascular prostate(cause of bleeding)    HEMORRHOID SURGERY  1961    HERNIA REPAIR Left 1990; 2003    groin    MOHS SURGERY Right 05/08/2023    nasal alar crease    MOHS SURGERY Right 12/18/2023    MOHS x 2: R nasal root, R distal nasal dorsum    MOHS SURGERY Right 01/04/2024    MOHS x 2: R nasal root and R brow done by Dr. Myra Presley       Allergies   Allergen Reactions    Cat Dander Anaphylaxis     Has difficulty breathing    Horse-Derived Products Hives and Rash     Allergy to horses     Outpatient Medications Marked as Taking for the 1/22/25 encounter (Office Visit) with Gomez Patrick MD   Medication Sig Dispense Refill    fluorouracil (EFUDEX) 5 % cream Apply topically 2 times daily for 14 days. 40 g 0    scopolamine (TRANSDERM-SCOP) transdermal patch Place 1 patch onto the skin every 72 hours 7 patch 1    levothyroxine (SYNTHROID) 125 MCG tablet TAKE 1 TABLET BY MOUTH EVERY DAY 90 tablet 3       Physical Examination       Physical Exam  There is a little bit of freckling on the tops of his hands. A 7 mm pink macule is present behind the right ear. The back of the neck appears normal. The scalp is normal with a few small scaly macules on the top. There are a few actinic keratoses on the scalp. The skin on his back is dry with a bit of scaling on the upper portion. The lower back appears normal. The backs of the thighs and legs look fine. There are a few small scaly pink macules on the left temple and

## 2025-01-27 LAB — DERMATOLOGY PATHOLOGY REPORT: ABNORMAL

## 2025-02-07 ENCOUNTER — TELEPHONE (OUTPATIENT)
Dept: FAMILY MEDICINE CLINIC | Age: 75
End: 2025-02-07

## 2025-02-07 NOTE — TELEPHONE ENCOUNTER
[phone] (home)     Patient called in regards to a pneumonia vaccine for him and his wife. They'll both be traveling at the end of this month and they've both had pneumonia vaccines in the past (2015 + 2017 for him) but he wanted to make sure if it's recommended that they get an updated one for that to protect both him and his wife from catching it.     He'd like to speak to clinical staff regarding this at the number above please.

## 2025-02-10 NOTE — TELEPHONE ENCOUNTER
Pt is asking if there is another pneumonia shot they need and can they get it here or at their pharmacy. Responding needs to be sent via my chart as they will be in a  today

## 2025-02-13 NOTE — PROGRESS NOTES
[facility]  Gomez Patrick MD  [phone]      Michael ODONNELL Lita  1950    74 y.o. male     Date of Visit: 2/14/2025    Chief Complaint: skin cancers    History of Present Illness:    Here today for treatment of 2 BCCs:     Comment:  ACCESSION   [id number]    DIAGNOSIS    A. RIGHT POSTAURICULAR  Superficial basal cell carcinoma    B. RIGHT PROXIMAL ANTERIOR THIGH  Superficial basal cell carcinoma    Pathologist Signature  Joseluis Sims DO       Review of Systems:  Gen: Feels well, good sense of health.    Past Medical History, Family History, Surgical History, Medications and Allergies reviewed.    Past Medical History:   Diagnosis Date    Colon, diverticulosis 09/18/2024    Colorectal polyps 01/01/2003    Hx of colonoscopy 10/06/2003    Hyperlipidemia 12/21/2010    Hypothyroidism     Torn tendon 2023    right hand, wears support brace     Past Surgical History:   Procedure Laterality Date    APPENDECTOMY      COLON SURGERY      colectomy; telescoped colon and had a piece removed at age 12    COLONOSCOPY  2000,10/30/2012    neg  q10    COLONOSCOPY N/A 9/18/2024    COLONOSCOPY POLYPECTOMY COLD SNARE/BIOPSY performed by Martín Maldonado MD at [facility] ENDOSCOPY    CYSTOSCOPY  04/12/2022    Bimal--gross hematuria--large vascular prostate(cause of bleeding)    HEMORRHOID SURGERY  1961    HERNIA REPAIR Left 1990; 2003    groin    MOHS SURGERY Right 05/08/2023    nasal alar crease    MOHS SURGERY Right 12/18/2023    MOHS x 2: R nasal root, R distal nasal dorsum    MOHS SURGERY Right 01/04/2024    MOHS x 2: R nasal root and R brow done by Dr. Myra Presley       Allergies   Allergen Reactions    Cat Dander Anaphylaxis     Has difficulty breathing    Horse-Derived Products Hives and Rash     Allergy to horses     Outpatient Medications Marked as Taking for the 2/14/25 encounter (Procedure visit) with Gomez Patrick MD   Medication Sig Dispense Refill    fluorouracil (EFUDEX) 5 % cream

## 2025-02-14 ENCOUNTER — PROCEDURE VISIT (OUTPATIENT)
Age: 75
End: 2025-02-14

## 2025-02-14 DIAGNOSIS — C44.712 BASAL CELL CARCINOMA (BCC) OF RIGHT THIGH: ICD-10-CM

## 2025-02-14 DIAGNOSIS — C44.212 BASAL CELL CARCINOMA (BCC) OF RIGHT POSTAURICULAR REGION: Primary | ICD-10-CM

## 2025-02-23 RX ORDER — LEVOTHYROXINE SODIUM 125 UG/1
TABLET ORAL
Qty: 90 TABLET | Refills: 3 | Status: SHIPPED | OUTPATIENT
Start: 2025-02-23

## 2025-05-14 ENCOUNTER — TELEPHONE (OUTPATIENT)
Dept: FAMILY MEDICINE CLINIC | Age: 75
End: 2025-05-14

## 2025-05-14 DIAGNOSIS — M25.519 SHOULDER PAIN, UNSPECIFIED CHRONICITY, UNSPECIFIED LATERALITY: Primary | ICD-10-CM

## 2025-05-20 ENCOUNTER — HOSPITAL ENCOUNTER (OUTPATIENT)
Dept: PHYSICAL THERAPY | Age: 75
Setting detail: THERAPIES SERIES
Discharge: HOME | End: 2025-05-20
Payer: MEDICARE

## 2025-05-20 PROCEDURE — 97161 PT EVAL LOW COMPLEX 20 MIN: CPT

## 2025-05-20 PROCEDURE — 97110 THERAPEUTIC EXERCISES: CPT

## 2025-05-20 NOTE — PLAN OF CARE
[facility]: [address], [address], [place] [address] office: [phone] fax: [phone]     Physical Therapy Initial Evaluation Certification      Dear Reza Andres MD ,    We had the pleasure of evaluating the following patient for physical therapy services at [facility].  A summary of our findings can be found in the initial assessment below.  This includes our plan of care.  If you have any questions or concerns regarding these findings, please do not hesitate to contact me at the office phone number listed above.  Thank you for the referral.     Physician Signature:_______________________________Date:__________________  By signing above (or electronic signature), therapist’s plan is approved by physician       Physical Therapy: TREATMENT/PROGRESS NOTE   Patient: Michael London (75 y.o. male)   Examination Date: 2025   :  1950 MRN: [id number]   Visit #: 1   Insurance Allowable Auth Needed   Yes [x]Yes    []No    Insurance: Payor: [employer] [facility] MEDICARE / Plan: [employer] MEDICARE COMPLETE / Product Type: *No Product type* /   Insurance ID: [id number] - (Medicare Managed)  Secondary Insurance (if applicable):    Treatment Diagnosis: r shoulder pain M25.511   Medical Diagnosis:  Right shoulder pain [M25.511]   Referring Physician: Reza Andres MD  PCP: Reza Andres MD     Plan of care signed (Y/N):     Date of Patient follow up with Physician:      Plan of Care Report: EVAL today  POC update due: (10 visits /OR AUTH LIMITS, whichever is less)  2025                                            Medical History:  Comorbidities:  None  Relevant Medical History:                                          Precautions/ Contra-indications:           Latex allergy:  NO  Pacemaker:    NO  Contraindications for Manipulation: None  Date of Surgery: N/A  Other:    Red Flags:  None    Suicide Screening:

## 2025-08-20 ENCOUNTER — OFFICE VISIT (OUTPATIENT)
Age: 75
End: 2025-08-20
Payer: MEDICARE

## 2025-08-20 DIAGNOSIS — L57.0 ACTINIC KERATOSIS: Primary | ICD-10-CM

## 2025-08-20 DIAGNOSIS — D48.5 NEOPLASM OF UNCERTAIN BEHAVIOR OF SKIN: ICD-10-CM

## 2025-08-20 DIAGNOSIS — C44.519 BASAL CELL CARCINOMA (BCC) OF UPPER BACK: ICD-10-CM

## 2025-08-20 PROCEDURE — 17000 DESTRUCT PREMALG LESION: CPT | Performed by: DERMATOLOGY

## 2025-08-20 PROCEDURE — 17261 DSTRJ MAL LES T/A/L .6-1.0CM: CPT | Performed by: DERMATOLOGY

## 2025-08-20 PROCEDURE — 17003 DESTRUCT PREMALG LES 2-14: CPT | Performed by: DERMATOLOGY

## 2025-08-20 PROCEDURE — 11102 TANGNTL BX SKIN SINGLE LES: CPT | Performed by: DERMATOLOGY

## 2025-08-25 LAB — DERMATOLOGY PATHOLOGY REPORT: ABNORMAL

## 2025-09-02 ENCOUNTER — OFFICE VISIT (OUTPATIENT)
Dept: FAMILY MEDICINE CLINIC | Age: 75
End: 2025-09-02

## 2025-09-02 VITALS
HEIGHT: 72 IN | WEIGHT: 189.5 LBS | DIASTOLIC BLOOD PRESSURE: 84 MMHG | SYSTOLIC BLOOD PRESSURE: 124 MMHG | BODY MASS INDEX: 25.67 KG/M2 | HEART RATE: 51 BPM | OXYGEN SATURATION: 95 %

## 2025-09-02 DIAGNOSIS — E03.9 HYPOTHYROIDISM, UNSPECIFIED TYPE: ICD-10-CM

## 2025-09-02 DIAGNOSIS — Z00.00 MEDICARE ANNUAL WELLNESS VISIT, SUBSEQUENT: Primary | ICD-10-CM

## 2025-09-02 DIAGNOSIS — K63.5 POLYP OF COLON, UNSPECIFIED PART OF COLON, UNSPECIFIED TYPE: ICD-10-CM

## 2025-09-02 DIAGNOSIS — E78.2 MIXED HYPERLIPIDEMIA: ICD-10-CM

## 2025-09-02 SDOH — ECONOMIC STABILITY: FOOD INSECURITY: WITHIN THE PAST 12 MONTHS, YOU WORRIED THAT YOUR FOOD WOULD RUN OUT BEFORE YOU GOT MONEY TO BUY MORE.: NEVER TRUE

## 2025-09-02 SDOH — ECONOMIC STABILITY: FOOD INSECURITY: WITHIN THE PAST 12 MONTHS, THE FOOD YOU BOUGHT JUST DIDN'T LAST AND YOU DIDN'T HAVE MONEY TO GET MORE.: NEVER TRUE

## 2025-09-02 ASSESSMENT — LIFESTYLE VARIABLES
HOW OFTEN DO YOU HAVE A DRINK CONTAINING ALCOHOL: MONTHLY OR LESS
HOW MANY STANDARD DRINKS CONTAINING ALCOHOL DO YOU HAVE ON A TYPICAL DAY: 1 OR 2

## 2025-09-02 ASSESSMENT — PATIENT HEALTH QUESTIONNAIRE - PHQ9
SUM OF ALL RESPONSES TO PHQ QUESTIONS 1-9: 0
2. FEELING DOWN, DEPRESSED OR HOPELESS: NOT AT ALL
SUM OF ALL RESPONSES TO PHQ QUESTIONS 1-9: 0
1. LITTLE INTEREST OR PLEASURE IN DOING THINGS: NOT AT ALL
SUM OF ALL RESPONSES TO PHQ QUESTIONS 1-9: 0
SUM OF ALL RESPONSES TO PHQ QUESTIONS 1-9: 0

## 2025-09-05 ENCOUNTER — PROCEDURE VISIT (OUTPATIENT)
Age: 75
End: 2025-09-05

## 2025-09-05 DIAGNOSIS — C44.619 BASAL CELL CARCINOMA (BCC) OF LEFT HAND: Primary | ICD-10-CM

## (undated) DEVICE — SNARE COLD DIAMOND 10MM THIN

## (undated) DEVICE — TRAP SPEC RETRV CLR PLAS POLYP IN LN SUCT QUIK CTCH